# Patient Record
Sex: MALE | Race: WHITE | NOT HISPANIC OR LATINO | Employment: FULL TIME | URBAN - METROPOLITAN AREA
[De-identification: names, ages, dates, MRNs, and addresses within clinical notes are randomized per-mention and may not be internally consistent; named-entity substitution may affect disease eponyms.]

---

## 2017-04-20 ENCOUNTER — OFFICE VISIT (OUTPATIENT)
Dept: FAMILY MEDICINE | Facility: CLINIC | Age: 22
End: 2017-04-20
Payer: COMMERCIAL

## 2017-04-20 VITALS
HEART RATE: 78 BPM | HEIGHT: 70 IN | SYSTOLIC BLOOD PRESSURE: 135 MMHG | OXYGEN SATURATION: 97 % | TEMPERATURE: 96.7 F | DIASTOLIC BLOOD PRESSURE: 83 MMHG | BODY MASS INDEX: 21.33 KG/M2 | WEIGHT: 149 LBS

## 2017-04-20 DIAGNOSIS — K64.8 INTERNAL HEMORRHOIDS: Primary | ICD-10-CM

## 2017-04-20 DIAGNOSIS — K59.09 CHRONIC CONSTIPATION: ICD-10-CM

## 2017-04-20 PROCEDURE — 99213 OFFICE O/P EST LOW 20 MIN: CPT | Performed by: INTERNAL MEDICINE

## 2017-04-20 RX ORDER — HYDROCORTISONE ACETATE 25 MG/1
25 SUPPOSITORY RECTAL 2 TIMES DAILY
Qty: 28 SUPPOSITORY | Refills: 5 | Status: SHIPPED | OUTPATIENT
Start: 2017-04-20 | End: 2017-08-24

## 2017-04-20 ASSESSMENT — ANXIETY QUESTIONNAIRES
6. BECOMING EASILY ANNOYED OR IRRITABLE: MORE THAN HALF THE DAYS
GAD7 TOTAL SCORE: 8
2. NOT BEING ABLE TO STOP OR CONTROL WORRYING: NOT AT ALL
5. BEING SO RESTLESS THAT IT IS HARD TO SIT STILL: SEVERAL DAYS
1. FEELING NERVOUS, ANXIOUS, OR ON EDGE: SEVERAL DAYS
3. WORRYING TOO MUCH ABOUT DIFFERENT THINGS: SEVERAL DAYS
7. FEELING AFRAID AS IF SOMETHING AWFUL MIGHT HAPPEN: MORE THAN HALF THE DAYS
IF YOU CHECKED OFF ANY PROBLEMS ON THIS QUESTIONNAIRE, HOW DIFFICULT HAVE THESE PROBLEMS MADE IT FOR YOU TO DO YOUR WORK, TAKE CARE OF THINGS AT HOME, OR GET ALONG WITH OTHER PEOPLE: SOMEWHAT DIFFICULT

## 2017-04-20 ASSESSMENT — PATIENT HEALTH QUESTIONNAIRE - PHQ9: 5. POOR APPETITE OR OVEREATING: SEVERAL DAYS

## 2017-04-20 NOTE — PROGRESS NOTES
SUBJECTIVE:                                                    Venu Cedeño is a 22 year old male who presents to clinic today for the following health issues:    Hemorrhoids     Onset: 1-2 days    Description:   Pain: YES  Itching: YES    Accompanying Signs & Symptoms:  Blood streaked toilet paper: YES  Blood in stool: YES  Changes in stool pattern: YES   History:   Any previous GI studies done:none  Family History of colon cancer: no     Precipitating factors:   None    Alleviating factors:  None     Therapies Tried and outcome: increased fiber in diet and increased fluids, unsure of improvement          Problem list and histories reviewed & adjusted, as indicated.  Additional history: as documented    Patient Active Problem List   Diagnosis     Major depressive disorder, recurrent episode, mild (H)     WICHO (generalized anxiety disorder)     Psychophysiological insomnia     Past Surgical History:   Procedure Laterality Date     MOUTH SURGERY         Social History   Substance Use Topics     Smoking status: Current Some Day Smoker     Smokeless tobacco: Not on file      Comment: 1 pack per week, e-cigarettes      Alcohol use 4.8 oz/week     8 Standard drinks or equivalent per week     Family History   Problem Relation Age of Onset     DIABETES Maternal Grandmother      Coronary Artery Disease No family hx of      Hypertension No family hx of      Hyperlipidemia No family hx of      CEREBROVASCULAR DISEASE No family hx of      Breast Cancer No family hx of      Colon Cancer No family hx of      Prostate Cancer No family hx of          No Known Allergies  BP Readings from Last 3 Encounters:   04/20/17 135/83   10/07/16 136/80    Wt Readings from Last 3 Encounters:   04/20/17 149 lb (67.6 kg)   10/07/16 155 lb (70.3 kg)                      ROS:  C: NEGATIVE for fever, chills, change in weight  I: NEGATIVE for worrisome rashes, moles or lesions  E: NEGATIVE for vision changes or irritation  E/M: NEGATIVE for ear,  "mouth and throat problems  R: NEGATIVE for significant cough or SOB  CV: NEGATIVE for chest pain, palpitations or peripheral edema  GI: NEGATIVE for abdominal pain , diarrhea, jaundice and melena  : NEGATIVE for frequency, dysuria, or hematuria  M: NEGATIVE for significant arthralgias or myalgia  N: NEGATIVE for weakness, dizziness or paresthesias  E: NEGATIVE for temperature intolerance, skin/hair changes  H: NEGATIVE for bleeding problems  P: NEGATIVE for changes in mood or affect    OBJECTIVE:                                                    /83 (BP Location: Left arm, Patient Position: Chair, Cuff Size: Adult Regular)  Pulse 78  Temp 96.7  F (35.9  C) (Oral)  Ht 5' 10.25\" (1.784 m)  Wt 149 lb (67.6 kg)  SpO2 97%  BMI 21.23 kg/m2  Body mass index is 21.23 kg/(m^2).  GENERAL: healthy, alert and no distress  EYES: Eyes grossly normal to inspection  ABDOMEN: soft, nontender, no hepatosplenomegaly, no masses and bowel sounds normal  RECTAL (male): No external hemorrhoids.  MS: no gross musculoskeletal defects noted, no edema  SKIN: no suspicious lesions or rashes  NEURO: Normal strength and tone, mentation intact and speech normal    Diagnostic Test Results:  none      ASSESSMENT/PLAN:                                                            (K64.8) Internal hemorrhoids  (primary encounter diagnosis)  Comment: No evidence of external hemorrhoids. Anoscopy not done since such equipment is not available at my disposal. If condition worsens, then consider screening for inflammatory bowel disease.  Plan: hydrocortisone (ANUSOL-HC) 25 MG Suppository            (K59.09) Chronic constipation  Comment: Predisposing factor to conditon #1.  Plan: linaclotide (LINZESS) 145 MCG capsule              Follow-up visit if condition worsens.    Matthias Patel MD  [facility]  "

## 2017-04-20 NOTE — MR AVS SNAPSHOT
After Visit Summary   4/20/2017    Venu Cedeño    MRN: [id number]           Patient Information     Date Of Birth          1995        Visit Information        Provider Department      4/20/2017 4:40 PM Matthias Patel MD [facility]        Today's Diagnoses     Chronic constipation    -  1    Internal hemorrhoids          Care Instructions    This summary includes the important diagnoses, test, medications and other important parts of your medical history.  Below are a few good we sites you can use to learn more about these.     Www.[employer] : Up to date and easily searchable information on multiple topics.  Www.[facility]."[employer]/Pharmacy/c_539084.asp : [facility] $4.99 medications  Www.[employer].gov : medication info, interactive tutorials, watch real surgeries online  Www.familydoctor.org : good info from the Academy of Family Physicians  Www.[employer] : good info from the [facility]  Www.cdc.gov : public health info, travel advisories, epidemics (H1N1)  Www.aap.org : children's health info, normal development, vaccinations  Www.health.[facility].[place].us : [place] dept of heatlh, public health issues in [place], N1N1    Based on your medical history and these are the current health maintenance or preventive care services that you are due for (some may have been done at this visit:)  Health Maintenance Due   Topic Date Due     DEPRESSION ACTION PLAN Q1 YR (NO INBASKET)  1995     HPV IMMUNIZATION (1 of 3 - Male 3 Dose Series) 02/11/2006     PHQ-9 Q6 MONTHS (NO INBASKET)  04/07/2017     =================================================================================  Normal Values   Blood pressure  <140/90 for most adults    <130/80 for some chronic diseases (ask your care team about yours)    BMI (body mass index)  18.5-25 kg/m2 (based on height and weight)     Thank you for visiting [facility]    Normal or non-critical lab and imaging  results will be communicated to you by MyChart, letter or phone within 7 days.  If you do not hear from us within 10 days, please call the clinic. If you have a critical or abnormal lab result, we will notify you by phone as soon as possible.     If you have any questions regarding your visit please contact:     Team Comfort:   Clinic Hours Telephone Number   Dr. Herbert Taylor   7am-5pm  Monday - Friday [phone]  [facility]   Pharmacy 8am-8pm Monday-Thursday      8am-6pm Friday  9am-5pm Saturday-Sunday [phone]   Urgent Care 11am-8pm Monday-Friday        9am-5pm Saturday-Sunday [phone]     After hours, weekend or if you need to make an appointment with your primary provider please call [phone].   After Hours nurse advise: call [facility]: [phone]    Medication Refills:  Call your pharmacy and they will forward the refill to us. Please allow 3 business days for your refills to be completed.    Use [employer] (secure email communication and access to your chart) to send your primary care provider a message or make an appointment. Ask someone on your Team how to sign up for [employer]. To log on to [employer] or for more information in [employer] please visit the website at [email or web address].  As of October 8, 2013, all password changes, disabled accounts, or ID changes in [employer]/MyHealth will be done by our Access Services Department.   If you need help with your account or password, call: [phone]. Clinic staff no longer has the ability to change passwords.     Hemorrhoids    Hemorrhoids are swollen and inflamed veins inside the rectum and near the anus. The rectum is the last several inches of the colon. The anus is the passage between the rectum and the outside of the body.  Causes   The veins can become swollen due to increased pressure in them. This is most often caused by:    Chronic constipation  or diarrhea    Straining when having a bowel movement    Sitting too long on the toilet    A low-fiber diet    Pregnancy  Symptoms     Bleeding from the rectum (this may be noticeable after bowel movements)    Lump near the anus    Itching around the anus    Pain around the anus  There are different types of hemorrhoids. Depending on the type you have and the severity, you may be able to treat yourself at home. In some cases, a procedure may be the best treatment option. Your healthcare provider can tell you more about this, if needed.  Home care  General care    To get relief from pain or itching, try:    Topical products. Your healthcare provider may prescribe or recommend creams, ointments, or pads that can be applied to the hemorrhoid. Use these exactly as directed.    Medicines. Your healthcare provider may recommend stool softeners, suppositories, or laxatives to help manage constipation. Use these exactly as directed.    Sitz baths. A sitz bath involves sitting in a few inches of warm bath water. Be careful not to make the water so hot that you burn yourself--test it before sitting in it. Soak for about 10 to 15 minutes a few times a day. This may help relieve pain.  Tips to help prevent hemorrhoids    Eat more fiber. Fiber adds bulk to stool and absorbs water as it moves through your colon. This makes stool softer and easier to pass.    Increase the fiber in your diet with more fiber-rich foods. These include fresh fruit, vegetables, and whole grains.    Take a fiber supplement or bulking agent, if advised to by your provider. These include products such as psyllium or methylcellulose.    Drink plenty of water, if directed to by your provider. This can help keep stool soft.    Be more active. Frequent exercise aids digestion and helps prevent constipation. It may also help make bowel movements more regular.    Don t strain during bowel movements. This can make hemorrhoids more likely. Also, don t sit on the  toilet for long periods of time.  Follow-up care  Follow up with your healthcare provider, or as advised. If a culture or imaging tests were done, you will be notified of the results when they are ready. This may take a few days or longer.  When to seek medical advice  Call your healthcare provider right away if any of these occur:    Increased bleeding from the rectum    Increased pain around the rectum or anus    Weakness or dizziness   Call 911   Call 911 or return to the emergency department right away if any of these occur:    Trouble breathing or swallowing    Fainting or loss of consciousness    Unusually fast heart rate    Vomiting blood    Large amounts of blood in stool      5519-2272 [employer]. [address] [place] [address]. All rights reserved. This information is not intended as a substitute for professional medical care. Always follow your healthcare professional's instructions.        Treating Hemorrhoids: Removal  If your symptoms persist, your doctor may recommend removing the hemorrhoid. This can be done in your doctor s office or at a surgical center. In most cases, no special preparation is needed. Keep in mind that your treatment may differ depending on your symptoms and the location of the hemorrhoid.  Internal Hemorrhoids  You ll be asked to lie or kneel on a table. Your doctor then inserts an anoscope to view the anal canal. To treat the hemorrhoid, your doctor will use one of the methods listed below. Because internal hemorrhoids do not have nerves that sense pain, you won t have too much discomfort. You can often return to your normal routine the same day. If you have many hemorrhoids, you may need repeated treatments.    Banding  The banding method is done by placing tight elastic bands around the base of the hemorrhoid. This cuts off blood supply to the hemorrhoid, causing it to fall off. This usually takes about a week. The area then heals within a few  days.       Infrared Coagulation  This procedure is done using a small probe that exposes the hemorrhoid to short bursts of infrared light. This seals off the blood vessel, causing it to shrink. Slight bleeding may occur for a few days. The area usually heals within a week or two.       Sclerotherapy  Sclerotherapy is done by injecting a chemical into the tissue around the hemorrhoid. The chemical causes the hemorrhoid to shrink within a few days. Bleeding usually stops in about 24 hours.  Thrombosed External Hemorrhoids  Thrombosed external hemorrhoids are often very painful. That s because the swollen hemorrhoid stretches the sensitive skin around it. To relieve the pain, your doctor may remove the blood clot. This takes just a few minutes. You may need to rest for a few days before returning to work.    Numbing the Hemorrhoid: You ll be asked to lie or kneel on a table. The hemorrhoid is then injected with a local anesthetic. This may cause some discomfort for a moment. But within a short time your doctor will be able to remove the hemorrhoid without causing pain.    Removing the Hemorrhoid: A small incision is made to remove the blood clot. The hemorrhoid may also be removed. The skin is then either closed with sutures or left open to heal on its own. The area around the incision will likely be sore for a few days. But your pain should improve soon after the procedure.  Risks and Complications  The possible risks and complications include:    Infection    Bleeding    Trouble urinating (Doesn't occur with thrombosed external hemorrhoids)    Narrowing of the anal canal (very rare, doesn't occur with thrombosed external hemorrhoids)  When to Call Your Doctor  After your procedure, call your doctor if you have:    Increasing pain    Fever or chills    Persistent bleeding    Trouble urinating        5106-0134 [employer]. [address] [place] [address], [place] [address]. All rights reserved. This information  is not intended as a substitute for professional medical care. Always follow your healthcare professional's instructions.        Understanding Hemorrhoids  Hemorrhoid tissues are  cushions  of blood vessels that swell slightly during bowel movements. Too much pressure on the anal canal can make these tissues remain enlarged and cause symptoms. This can happen both inside and outside the anal canal.    Parts of the Anal Canal    Internal hemorrhoid tissue is in the upper area of the anal canal.    The rectum is the last several inches of the colon. This is where stool is stored prior to bowel movements.    Anal sphincters are ring-shaped muscles that expand and contract to control the anal opening.    External hemorrhoid tissue lies under the anal skin.    The anus is the passage between the rectum and the outside of the body.  Normal Hemorrhoid Tissue  Hemorrhoid tissues play an important role in helping your body eliminate waste. Food passes from the stomach through the intestines. The waste (stool) then travels through the colon to the rectum. It is stored in the rectum until it s ready to be passed from the anus. During bowel movements, hemorrhoids swell with blood and become slightly larger. This swelling helps protect and cushion the anal canal as stool passes from the body. Once the stool has passed, the tissues stop swelling and return to normal.    Problem Hemorrhoids  Pressure due to straining or other factors can cause hemorrhoid tissues to remain swollen. When this happens to the hemorrhoid tissues in the anal canal they re called internal hemorrhoids. Swollen tissues around the anal opening are called external hemorrhoids. Depending on the location, your symptoms can differ.    Internal hemorrhoids often occur in clusters around the wall of the anal canal. They are usually painless. But they may prolapse (protrude out of the anus) due to straining or pressure from hard stool. After the bowel movement is  over, they may then reduce (return inside the body). Internal hemorrhoids often bleed. They can also discharge mucus.    External hemorrhoids are located at the anal opening, just beneath the skin. These tissues rarely cause problems unless they thrombose (form a blood clot). When this occurs, a hard, bluish lump may appear. A thrombosed hemorrhoid also causes sudden, severe pain. In time, the clot may go away on its own. This sometimes leaves a  skin tag  of tissue stretched by the clot.  Hemorrhoid Symptoms  Hemorrhoid symptoms may include:    Pain or a burning sensation    Bleeding during bowel movements    Protrusion of tissue from the anus    Itching around the anus  Causes of Hemorrhoids  There s no single cause of hemorrhoids. Most often, though, they are caused by too much pressure on the anal canal. This can be due to:    Chronic (ongoing) constipation    Straining during bowel movements    Sitting too long on the toilet    Strenuous exercise or heavy lifting   Pregnancy and childbirth    Aging    Diarrhea     8752-0295 [employer]. [address] [place] [address]. All rights reserved. This information is not intended as a substitute for professional medical care. Always follow your healthcare professional's instructions.        Crohn s Disease    Crohn s disease is inflammation of the intestinal tract that comes and goes in  flare-ups . This is a chronic (long-term) illness. During a flare-up, intense abdominal pain and fever may be felt. Mucus, blood, or pus may appear in the stool. Between flare ups, inflammation lessens and there usually are no symptoms. Crohn s disease is a form of inflammatory bowel disease (IBD).   Symptoms of Crohn's disease may include:    Abdominal cramps and pain    Diarrhea, usually bloody, alternating with constipation    Mucus in stools    Rectal bleeding    Rectal pain    Fever    Low energy    Decreased appetite and weight loss  No one knows what  "exactly causes Crohn's disease, and there is no cure. The goal of treatment is to control and relieve symptoms and prevent complications, so you can lead a full and active life. No single treatment works for everyone, but many things can be done to help.  Diet  Foods did not cause your Crohn's, but they can affect it. Unfortunately, there is no one diet that works for everyone. Below are some things to try. Keep a food log to figure out what you are sensitive to.    Eat more slowly and smaller amounts at a time, but more often. Remember, you can always eat more, but cannot eat less once you've eaten too much.    High fiber foods are complicated. While they may help constipation they can make the bloating, cramping, gas, and diarrhea worse.    Try avoiding dairy products, sometimes this helps    Try cutting out foods that are high in fat and fatty meats    Eat less sugar    Bloating or passing excess gas may be controlled. Be careful with \"gassy\" vegetables and fruits like beans, cabbage, broccoli, and cauliflower.    Be careful of carbonated beverages and fruit juices. They can make the bloating and diarrhea worse.    Caffeine, alcohol, and stimulants may worsen symptoms. These include coffee, tea, sodas, energy drinks, and chocolate.  Lifestyle  Although stress does not cause Crohn's, it is often a factor in flare-ups. It can also affect how you feel about and cope with your condition.    Look for factors that seem to worsen your symptoms such as stress and emotions.    Counseling can often help relieve stress. So can self-help measures such as exercise, yoga, and meditation.    Depression can be a part of this illness and antidepressants may be prescribed. This may actually help with diarrhea, constipation, and cramping, as well as depression.    Smoking doesn't cause Crohn's, but can make the symptoms worse.  Medicines  Your health care provider may prescribe medicines. If so, take them as directed. For acute " flare-ups, prescription medicines can be prescribed. Contact your provider if you need this.    Ask your health care provider before taking any antidiarrheal medicines.    Avoid anti-inflammatory medicines like ibuprofen or naproxen.    Consider nutritional supplements. This is especially true if the diarrhea is prolonged, or you aren't eating or are losing weight.  Follow-up care  Follow up with your healthcare provider, or as advised. If a stool sample was taken or cultures were done, you will be told if your treatment needs to change. Call as directed for the results.  Support  Support groups for persons Crohn s disease can be a source of useful information on how others are coping with this illness. They are available in person, on the phone, or via the Internet. Contact the following resources for more information.    Crohn s and Colitis Foundation of [employer] 428.985.5646 www.ccfa.org    National Digestive Diseases Information Clearinghouse (NDDIC) 570.948.8165 www.digestive.niddk.nih.gov  When to seek medical advice  Call your health care provider right away if any of these occur:    Fever of 100.4 F (38 C) or higher, or as directed by your health care provider    Abdominal pain that doesn't get better when you take the usual measures    Mucus, pus, or blood in the stool (dark or bright red)    Repeated vomiting    Abdominal swelling and pain that doesn't go away after a few hours  Call 911  Call 911 if any of these occur:    Trouble breathing    Confusion    Extreme sleepiness or trouble waking up    Fainting or loss of consciousness    Rapid heart rate    0144-0023 [employer]. [address] [place] [address], [place] [address]. All rights reserved. This information is not intended as a substitute for professional medical care. Always follow your healthcare professional's instructions.        What is Ulcerative Colitis?  Ulcerative colitis is a chronic disease that causes inflammation and sores  called ulcers in the inner lining of the rectum and colon. It is a form of inflammatory bowel disease (IBD). No one knows for certain what causes ulcerative colitis, but symptoms can be treated. People with ulcerative colitis can lead full, active lives.     Ulcerative colitis affects the inside layers of the rectum and colon.   Symptoms of ulcerative colitis  Symptoms are often related to bowel movements. Symptoms include:    Frequent, loose bowel movements    Blood and pus in stools, or rectal bleeding    Feeling of incomplete bowel movement    Urgency (feeling that you need to have a bowel movement right away)    Crampy, abdominal discomfort    Loss of appetite    Fatigue    Joint pain    Rectal pain that comes and goes  Your treatment options  Your doctor will discuss your treatment options with you. They may include medications, lifestyle changes, surgery, or a combination of these. Treatment helps you stay as active as you want to be. Keep in mind that ulcerative colitis is considered chronic. That means it usually can t be cured. But treatment may ease symptoms. And even though you have a chronic illness, you can still live a full life.  Medications  Your doctor will try to find the medications that work best for you. These may include:    A type of anti-inflammatory medication (called 5-ASA compounds or mesalamine) to help reduce intestinal swelling and inflammation    Corticosteroids (used for short-term, not maintenance, treatment) to help reduce inflammation (swelling, short-term irritation)    Antibiotics to fight bacteria, if there is an infection    Medications to control your body s immune system, such as immunomodulators or biologic agents  Lifestyle changes    Certain foods can worsen symptoms. You may need to change what you eat. Avoid any food that makes your symptoms worse. These foods vary from person to person. But high-fiber foods (such as fresh vegetables) and high-fat foods (such as dairy  "products and red meat) cause symptoms in many people. Keep track of foods that cause you problems.    To a lesser dengree, stress also can worsen symptoms. Reducing stress may help. Techniques like relaxation exercises, meditation, and deep breathing can help you control stress. Your health care provider may be able to tell you more about these.  If surgery is needed  Surgery may help control or even cure ulcerative colitis by removing a severely affected part of the colon. If this is an option for you, your doctor can give you more information.    3233-0208 [employer]. [address] [place] [address]. All rights reserved. This information is not intended as a substitute for professional medical care. Always follow your healthcare professional's instructions.              Follow-ups after your visit        Who to contact     If you have questions or need follow up information about today's clinic visit or your schedule please contact [facility] directly at [phone].  Normal or non-critical lab and imaging results will be communicated to you by MyChart, letter or phone within 4 business days after the clinic has received the results. If you do not hear from us within 7 days, please contact the clinic through myOrdert or phone. If you have a critical or abnormal lab result, we will notify you by phone as soon as possible.  Submit refill requests through [employer] or call your pharmacy and they will forward the refill request to us. Please allow 3 business days for your refill to be completed.          Additional Information About Your Visit        [employer] Information     [employer] lets you send messages to your doctor, view your test results, renew your prescriptions, schedule appointments and more. To sign up, go to [email or web address] . Click on \"Log in\" on the left side of the screen, which will take you to the Welcome page. Then click on \"Sign up Now\" on the right " "side of the page.     You will be asked to enter the access code listed below, as well as some personal information. Please follow the directions to create your username and password.     Your access code is: [email or web address]  Expires: 2017  5:08 PM     Your access code will  in 90 days. If you need help or a new code, please call your [facility] clinic or [phone].        Care EveryWhere ID     This is your Care EveryWhere ID. This could be used by other organizations to access your [facility] medical records  [id number]        Your Vitals Were     Pulse Temperature Height Pulse Oximetry BMI (Body Mass Index)       78 96.7  F (35.9  C) (Oral) 5' 10.25\" (1.784 m) 97% 21.23 kg/m2        Blood Pressure from Last 3 Encounters:   17 135/83   10/07/16 136/80    Weight from Last 3 Encounters:   17 149 lb (67.6 kg)   10/07/16 155 lb (70.3 kg)              Today, you had the following     No orders found for display         Today's Medication Changes          These changes are accurate as of: 17  5:10 PM.  If you have any questions, ask your nurse or doctor.               Start taking these medicines.        Dose/Directions    hydrocortisone 25 MG Suppository   Commonly known as:  ANUSOL-HC   Used for:  Internal hemorrhoids   Started by:  Matthias Patel MD        Dose:  25 mg   Place 1 suppository (25 mg) rectally 2 times daily   Quantity:  28 suppository   Refills:  5       linaclotide 145 MCG capsule   Commonly known as:  LINZESS   Used for:  Chronic constipation   Started by:  Matthias Patel MD        Dose:  145 mcg   Take 1 capsule (145 mcg) by mouth every morning (before breakfast)   Quantity:  30 capsule   Refills:  11            Where to get your medicines      These medications were sent to [facility] - [place], [place] - [address]  [address], [facility] [address]     Phone:  [phone]     hydrocortisone 25 MG Suppository    linaclotide " 145 MCG capsule                Primary Care Provider Office Phone # Fax #    Matthias Patel [phone]       [facility] [address]  [facility] [address]        Thank you!     Thank you for choosing [facility]  for your care. Our goal is always to provide you with excellent care. Hearing back from our patients is one way we can continue to improve our services. Please take a few minutes to complete the written survey that you may receive in the mail after your visit with us. Thank you!             Your Updated Medication List - Protect others around you: Learn how to safely use, store and throw away your medicines at www.disposemymeds.org.          This list is accurate as of: 4/20/17  5:10 PM.  Always use your most recent med list.                   Brand Name Dispense Instructions for use    hydrocortisone 25 MG Suppository    ANUSOL-HC    28 suppository    Place 1 suppository (25 mg) rectally 2 times daily       linaclotide 145 MCG capsule    LINZESS    30 capsule    Take 1 capsule (145 mcg) by mouth every morning (before breakfast)

## 2017-04-20 NOTE — NURSING NOTE
"Chief Complaint   Patient presents with     Rectal Problem     hemorrhoids       Initial /83 (BP Location: Left arm, Patient Position: Chair, Cuff Size: Adult Regular)  Pulse 78  Temp 96.7  F (35.9  C) (Oral)  Ht 5' 10.25\" (1.784 m)  Wt 149 lb (67.6 kg)  SpO2 97%  BMI 21.23 kg/m2 Estimated body mass index is 21.23 kg/(m^2) as calculated from the following:    Height as of this encounter: 5' 10.25\" (1.784 m).    Weight as of this encounter: 149 lb (67.6 kg).  Medication Reconciliation: complete     Pa Irene Infante MA      "

## 2017-04-20 NOTE — PATIENT INSTRUCTIONS
This summary includes the important diagnoses, test, medications and other important parts of your medical history.  Below are a few good we sites you can use to learn more about these.     Www.[employer].org : Up to date and easily searchable information on multiple topics.  Www.[employer].[email or web address]/c_[email or web address] : [facility] $4.99 medications  Www.medlineplus.gov : medication info, interactive tutorials, watch real surgeries online  Www.familydoctor.org : good info from the Academy of Family Physicians  Www.[employer].com : good info from the [facility]  Www.cdc.gov : public health info, travel advisories, epidemics (H1N1)  Www.aap.org : children's health info, normal development, vaccinations  Www.health.[facility].[place].us : [place] dept of heat, public health issues in [place], N1N1    Based on your medical history and these are the current health maintenance or preventive care services that you are due for (some may have been done at this visit:)  Health Maintenance Due   Topic Date Due     DEPRESSION ACTION PLAN Q1 YR (NO INBASKET)  1995     HPV IMMUNIZATION (1 of 3 - Male 3 Dose Series) 02/11/2006     PHQ-9 Q6 MONTHS (NO INBASKET)  04/07/2017     =================================================================================  Normal Values   Blood pressure  <140/90 for most adults    <130/80 for some chronic diseases (ask your care team about yours)    BMI (body mass index)  18.5-25 kg/m2 (based on height and weight)     Thank you for visiting [facility]    Normal or non-critical lab and imaging results will be communicated to you by MyChart, letter or phone within 7 days.  If you do not hear from us within 10 days, please call the clinic. If you have a critical or abnormal lab result, we will notify you by phone as soon as possible.     If you have any questions regarding your visit please contact:     Team Comfort:   Clinic Hours Telephone Number   Dr. Herbert Patel Dr.  Lin Saucedo  [facility]   7am-5pm  Monday - Friday [phone]  [facility]   Pharmacy 8am-8pm Monday-Thursday      8am-6pm Friday  9am-5pm Saturday-Sunday [phone]   Urgent Care 11am-8pm Monday-Friday        9am-5pm Saturday-Sunday [phone]     After hours, weekend or if you need to make an appointment with your primary provider please call [phone].   After Hours nurse advise: call [facility]: [phone]    Medication Refills:  Call your pharmacy and they will forward the refill to us. Please allow 3 business days for your refills to be completed.    Use [employer] Outdoors (secure email communication and access to your chart) to send your primary care provider a message or make an appointment. Ask someone on your Team how to sign up for GO Outdoors. To log on to 1DayLater or for more information in [employer] please visit the website at [email or web address].  As of October 8, 2013, all password changes, disabled accounts, or ID changes in GO Outdoors/MyHealth will be done by our Access Services Department.   If you need help with your account or password, call: [phone]. Clinic staff no longer has the ability to change passwords.     Hemorrhoids    Hemorrhoids are swollen and inflamed veins inside the rectum and near the anus. The rectum is the last several inches of the colon. The anus is the passage between the rectum and the outside of the body.  Causes   The veins can become swollen due to increased pressure in them. This is most often caused by:    Chronic constipation or diarrhea    Straining when having a bowel movement    Sitting too long on the toilet    A low-fiber diet    Pregnancy  Symptoms     Bleeding from the rectum (this may be noticeable after bowel movements)    Lump near the anus    Itching around the anus    Pain around the anus  There are different types of hemorrhoids. Depending on the type you have and the severity, you may be able to treat  yourself at home. In some cases, a procedure may be the best treatment option. Your healthcare provider can tell you more about this, if needed.  Home care  General care    To get relief from pain or itching, try:    Topical products. Your healthcare provider may prescribe or recommend creams, ointments, or pads that can be applied to the hemorrhoid. Use these exactly as directed.    Medicines. Your healthcare provider may recommend stool softeners, suppositories, or laxatives to help manage constipation. Use these exactly as directed.    Sitz baths. A sitz bath involves sitting in a few inches of warm bath water. Be careful not to make the water so hot that you burn yourself--test it before sitting in it. Soak for about 10 to 15 minutes a few times a day. This may help relieve pain.  Tips to help prevent hemorrhoids    Eat more fiber. Fiber adds bulk to stool and absorbs water as it moves through your colon. This makes stool softer and easier to pass.    Increase the fiber in your diet with more fiber-rich foods. These include fresh fruit, vegetables, and whole grains.    Take a fiber supplement or bulking agent, if advised to by your provider. These include products such as psyllium or methylcellulose.    Drink plenty of water, if directed to by your provider. This can help keep stool soft.    Be more active. Frequent exercise aids digestion and helps prevent constipation. It may also help make bowel movements more regular.    Don t strain during bowel movements. This can make hemorrhoids more likely. Also, don t sit on the toilet for long periods of time.  Follow-up care  Follow up with your healthcare provider, or as advised. If a culture or imaging tests were done, you will be notified of the results when they are ready. This may take a few days or longer.  When to seek medical advice  Call your healthcare provider right away if any of these occur:    Increased bleeding from the rectum    Increased pain around  the rectum or anus    Weakness or dizziness   Call 911   Call 911 or return to the emergency department right away if any of these occur:    Trouble breathing or swallowing    Fainting or loss of consciousness    Unusually fast heart rate    Vomiting blood    Large amounts of blood in stool      [id number] [employer]. [address] [place] [address]. All rights reserved. This information is not intended as a substitute for professional medical care. Always follow your healthcare professional's instructions.        Treating Hemorrhoids: Removal  If your symptoms persist, your doctor may recommend removing the hemorrhoid. This can be done in your doctor s office or at a surgical center. In most cases, no special preparation is needed. Keep in mind that your treatment may differ depending on your symptoms and the location of the hemorrhoid.  Internal Hemorrhoids  You ll be asked to lie or kneel on a table. Your doctor then inserts an anoscope to view the anal canal. To treat the hemorrhoid, your doctor will use one of the methods listed below. Because internal hemorrhoids do not have nerves that sense pain, you won t have too much discomfort. You can often return to your normal routine the same day. If you have many hemorrhoids, you may need repeated treatments.    Banding  The banding method is done by placing tight elastic bands around the base of the hemorrhoid. This cuts off blood supply to the hemorrhoid, causing it to fall off. This usually takes about a week. The area then heals within a few days.       Infrared Coagulation  This procedure is done using a small probe that exposes the hemorrhoid to short bursts of infrared light. This seals off the blood vessel, causing it to shrink. Slight bleeding may occur for a few days. The area usually heals within a week or two.       Sclerotherapy  Sclerotherapy is done by injecting a chemical into the tissue around the hemorrhoid. The chemical  causes the hemorrhoid to shrink within a few days. Bleeding usually stops in about 24 hours.  Thrombosed External Hemorrhoids  Thrombosed external hemorrhoids are often very painful. That s because the swollen hemorrhoid stretches the sensitive skin around it. To relieve the pain, your doctor may remove the blood clot. This takes just a few minutes. You may need to rest for a few days before returning to work.    Numbing the Hemorrhoid: You ll be asked to lie or kneel on a table. The hemorrhoid is then injected with a local anesthetic. This may cause some discomfort for a moment. But within a short time your doctor will be able to remove the hemorrhoid without causing pain.    Removing the Hemorrhoid: A small incision is made to remove the blood clot. The hemorrhoid may also be removed. The skin is then either closed with sutures or left open to heal on its own. The area around the incision will likely be sore for a few days. But your pain should improve soon after the procedure.  Risks and Complications  The possible risks and complications include:    Infection    Bleeding    Trouble urinating (Doesn't occur with thrombosed external hemorrhoids)    Narrowing of the anal canal (very rare, doesn't occur with thrombosed external hemorrhoids)  When to Call Your Doctor  After your procedure, call your doctor if you have:    Increasing pain    Fever or chills    Persistent bleeding    Trouble urinating        4846-1768 [employer]. [address] [place] [address]. All rights reserved. This information is not intended as a substitute for professional medical care. Always follow your healthcare professional's instructions.        Understanding Hemorrhoids  Hemorrhoid tissues are  cushions  of blood vessels that swell slightly during bowel movements. Too much pressure on the anal canal can make these tissues remain enlarged and cause symptoms. This can happen both inside and outside the anal  canal.    Parts of the Anal Canal    Internal hemorrhoid tissue is in the upper area of the anal canal.    The rectum is the last several inches of the colon. This is where stool is stored prior to bowel movements.    Anal sphincters are ring-shaped muscles that expand and contract to control the anal opening.    External hemorrhoid tissue lies under the anal skin.    The anus is the passage between the rectum and the outside of the body.  Normal Hemorrhoid Tissue  Hemorrhoid tissues play an important role in helping your body eliminate waste. Food passes from the stomach through the intestines. The waste (stool) then travels through the colon to the rectum. It is stored in the rectum until it s ready to be passed from the anus. During bowel movements, hemorrhoids swell with blood and become slightly larger. This swelling helps protect and cushion the anal canal as stool passes from the body. Once the stool has passed, the tissues stop swelling and return to normal.    Problem Hemorrhoids  Pressure due to straining or other factors can cause hemorrhoid tissues to remain swollen. When this happens to the hemorrhoid tissues in the anal canal they re called internal hemorrhoids. Swollen tissues around the anal opening are called external hemorrhoids. Depending on the location, your symptoms can differ.    Internal hemorrhoids often occur in clusters around the wall of the anal canal. They are usually painless. But they may prolapse (protrude out of the anus) due to straining or pressure from hard stool. After the bowel movement is over, they may then reduce (return inside the body). Internal hemorrhoids often bleed. They can also discharge mucus.    External hemorrhoids are located at the anal opening, just beneath the skin. These tissues rarely cause problems unless they thrombose (form a blood clot). When this occurs, a hard, bluish lump may appear. A thrombosed hemorrhoid also causes sudden, severe pain. In time,  the clot may go away on its own. This sometimes leaves a  skin tag  of tissue stretched by the clot.  Hemorrhoid Symptoms  Hemorrhoid symptoms may include:    Pain or a burning sensation    Bleeding during bowel movements    Protrusion of tissue from the anus    Itching around the anus  Causes of Hemorrhoids  There s no single cause of hemorrhoids. Most often, though, they are caused by too much pressure on the anal canal. This can be due to:    Chronic (ongoing) constipation    Straining during bowel movements    Sitting too long on the toilet    Strenuous exercise or heavy lifting   Pregnancy and childbirth    Aging    Diarrhea     [id number] [employer]. [address] [place] [address]. All rights reserved. This information is not intended as a substitute for professional medical care. Always follow your healthcare professional's instructions.        Crohn s Disease    Crohn s disease is inflammation of the intestinal tract that comes and goes in  flare-ups . This is a chronic (long-term) illness. During a flare-up, intense abdominal pain and fever may be felt. Mucus, blood, or pus may appear in the stool. Between flare ups, inflammation lessens and there usually are no symptoms. Crohn s disease is a form of inflammatory bowel disease (IBD).   Symptoms of Crohn's disease may include:    Abdominal cramps and pain    Diarrhea, usually bloody, alternating with constipation    Mucus in stools    Rectal bleeding    Rectal pain    Fever    Low energy    Decreased appetite and weight loss  No one knows what exactly causes Crohn's disease, and there is no cure. The goal of treatment is to control and relieve symptoms and prevent complications, so you can lead a full and active life. No single treatment works for everyone, but many things can be done to help.  Diet  Foods did not cause your Crohn's, but they can affect it. Unfortunately, there is no one diet that works for everyone. Below are some  "things to try. Keep a food log to figure out what you are sensitive to.    Eat more slowly and smaller amounts at a time, but more often. Remember, you can always eat more, but cannot eat less once you've eaten too much.    High fiber foods are complicated. While they may help constipation they can make the bloating, cramping, gas, and diarrhea worse.    Try avoiding dairy products, sometimes this helps    Try cutting out foods that are high in fat and fatty meats    Eat less sugar    Bloating or passing excess gas may be controlled. Be careful with \"gassy\" vegetables and fruits like beans, cabbage, broccoli, and cauliflower.    Be careful of carbonated beverages and fruit juices. They can make the bloating and diarrhea worse.    Caffeine, alcohol, and stimulants may worsen symptoms. These include coffee, tea, sodas, energy drinks, and chocolate.  Lifestyle  Although stress does not cause Crohn's, it is often a factor in flare-ups. It can also affect how you feel about and cope with your condition.    Look for factors that seem to worsen your symptoms such as stress and emotions.    Counseling can often help relieve stress. So can self-help measures such as exercise, yoga, and meditation.    Depression can be a part of this illness and antidepressants may be prescribed. This may actually help with diarrhea, constipation, and cramping, as well as depression.    Smoking doesn't cause Crohn's, but can make the symptoms worse.  Medicines  Your health care provider may prescribe medicines. If so, take them as directed. For acute flare-ups, prescription medicines can be prescribed. Contact your provider if you need this.    Ask your health care provider before taking any antidiarrheal medicines.    Avoid anti-inflammatory medicines like ibuprofen or naproxen.    Consider nutritional supplements. This is especially true if the diarrhea is prolonged, or you aren't eating or are losing weight.  Follow-up care  Follow up with " your healthcare provider, or as advised. If a stool sample was taken or cultures were done, you will be told if your treatment needs to change. Call as directed for the results.  Support  Support groups for persons Crohn s disease can be a source of useful information on how others are coping with this illness. They are available in person, on the phone, or via the Internet. Contact the following resources for more information.    Crohn s and Colitis Foundation of [employer] 458.879.1700 www.ccfa.org    National Digestive Diseases Information Clearinghouse (NDDIC) 308.624.7694 www.digestive.niddk.nih.gov  When to seek medical advice  Call your health care provider right away if any of these occur:    Fever of 100.4 F (38 C) or higher, or as directed by your health care provider    Abdominal pain that doesn't get better when you take the usual measures    Mucus, pus, or blood in the stool (dark or bright red)    Repeated vomiting    Abdominal swelling and pain that doesn't go away after a few hours  Call 911  Call 911 if any of these occur:    Trouble breathing    Confusion    Extreme sleepiness or trouble waking up    Fainting or loss of consciousness    Rapid heart rate    7535-6446 [employer]. [address] [place] [address]. All rights reserved. This information is not intended as a substitute for professional medical care. Always follow your healthcare professional's instructions.        What is Ulcerative Colitis?  Ulcerative colitis is a chronic disease that causes inflammation and sores called ulcers in the inner lining of the rectum and colon. It is a form of inflammatory bowel disease (IBD). No one knows for certain what causes ulcerative colitis, but symptoms can be treated. People with ulcerative colitis can lead full, active lives.     Ulcerative colitis affects the inside layers of the rectum and colon.   Symptoms of ulcerative colitis  Symptoms are often related to bowel  movements. Symptoms include:    Frequent, loose bowel movements    Blood and pus in stools, or rectal bleeding    Feeling of incomplete bowel movement    Urgency (feeling that you need to have a bowel movement right away)    Crampy, abdominal discomfort    Loss of appetite    Fatigue    Joint pain    Rectal pain that comes and goes  Your treatment options  Your doctor will discuss your treatment options with you. They may include medications, lifestyle changes, surgery, or a combination of these. Treatment helps you stay as active as you want to be. Keep in mind that ulcerative colitis is considered chronic. That means it usually can t be cured. But treatment may ease symptoms. And even though you have a chronic illness, you can still live a full life.  Medications  Your doctor will try to find the medications that work best for you. These may include:    A type of anti-inflammatory medication (called 5-ASA compounds or mesalamine) to help reduce intestinal swelling and inflammation    Corticosteroids (used for short-term, not maintenance, treatment) to help reduce inflammation (swelling, short-term irritation)    Antibiotics to fight bacteria, if there is an infection    Medications to control your body s immune system, such as immunomodulators or biologic agents  Lifestyle changes    Certain foods can worsen symptoms. You may need to change what you eat. Avoid any food that makes your symptoms worse. These foods vary from person to person. But high-fiber foods (such as fresh vegetables) and high-fat foods (such as dairy products and red meat) cause symptoms in many people. Keep track of foods that cause you problems.    To a lesser dengree, stress also can worsen symptoms. Reducing stress may help. Techniques like relaxation exercises, meditation, and deep breathing can help you control stress. Your health care provider may be able to tell you more about these.  If surgery is needed  Surgery may help control or  even cure ulcerative colitis by removing a severely affected part of the colon. If this is an option for you, your doctor can give you more information.    8263-9854 [employer]. [address] [place] [address], [place] [address]. All rights reserved. This information is not intended as a substitute for professional medical care. Always follow your healthcare professional's instructions.

## 2017-04-21 ASSESSMENT — ANXIETY QUESTIONNAIRES: GAD7 TOTAL SCORE: 8

## 2017-04-21 ASSESSMENT — PATIENT HEALTH QUESTIONNAIRE - PHQ9: SUM OF ALL RESPONSES TO PHQ QUESTIONS 1-9: 16

## 2017-08-24 ENCOUNTER — RADIANT APPOINTMENT (OUTPATIENT)
Dept: GENERAL RADIOLOGY | Facility: CLINIC | Age: 22
End: 2017-08-24
Attending: INTERNAL MEDICINE
Payer: COMMERCIAL

## 2017-08-24 ENCOUNTER — OFFICE VISIT (OUTPATIENT)
Dept: FAMILY MEDICINE | Facility: CLINIC | Age: 22
End: 2017-08-24
Payer: COMMERCIAL

## 2017-08-24 VITALS
BODY MASS INDEX: 20.76 KG/M2 | WEIGHT: 145 LBS | DIASTOLIC BLOOD PRESSURE: 79 MMHG | OXYGEN SATURATION: 96 % | HEIGHT: 70 IN | SYSTOLIC BLOOD PRESSURE: 136 MMHG | HEART RATE: 72 BPM | TEMPERATURE: 98.4 F

## 2017-08-24 DIAGNOSIS — D22.9 NEVUS: ICD-10-CM

## 2017-08-24 DIAGNOSIS — R59.0 CERVICAL LYMPHADENOPATHY: Primary | ICD-10-CM

## 2017-08-24 LAB
RETICS # AUTO: 68.5 10E9/L (ref 25–95)
RETICS/RBC NFR AUTO: 1.5 % (ref 0.5–2)

## 2017-08-24 PROCEDURE — 99213 OFFICE O/P EST LOW 20 MIN: CPT | Performed by: INTERNAL MEDICINE

## 2017-08-24 PROCEDURE — 36415 COLL VENOUS BLD VENIPUNCTURE: CPT | Performed by: INTERNAL MEDICINE

## 2017-08-24 PROCEDURE — 85060 BLOOD SMEAR INTERPRETATION: CPT | Performed by: INTERNAL MEDICINE

## 2017-08-24 PROCEDURE — 85045 AUTOMATED RETICULOCYTE COUNT: CPT | Performed by: INTERNAL MEDICINE

## 2017-08-24 PROCEDURE — 85025 COMPLETE CBC W/AUTO DIFF WBC: CPT | Performed by: INTERNAL MEDICINE

## 2017-08-24 PROCEDURE — 71020 XR CHEST 2 VW: CPT

## 2017-08-24 ASSESSMENT — ANXIETY QUESTIONNAIRES
GAD7 TOTAL SCORE: 5
2. NOT BEING ABLE TO STOP OR CONTROL WORRYING: SEVERAL DAYS
1. FEELING NERVOUS, ANXIOUS, OR ON EDGE: NOT AT ALL
3. WORRYING TOO MUCH ABOUT DIFFERENT THINGS: SEVERAL DAYS
6. BECOMING EASILY ANNOYED OR IRRITABLE: MORE THAN HALF THE DAYS
5. BEING SO RESTLESS THAT IT IS HARD TO SIT STILL: NOT AT ALL
IF YOU CHECKED OFF ANY PROBLEMS ON THIS QUESTIONNAIRE, HOW DIFFICULT HAVE THESE PROBLEMS MADE IT FOR YOU TO DO YOUR WORK, TAKE CARE OF THINGS AT HOME, OR GET ALONG WITH OTHER PEOPLE: SOMEWHAT DIFFICULT
7. FEELING AFRAID AS IF SOMETHING AWFUL MIGHT HAPPEN: SEVERAL DAYS

## 2017-08-24 ASSESSMENT — PATIENT HEALTH QUESTIONNAIRE - PHQ9
5. POOR APPETITE OR OVEREATING: NOT AT ALL
SUM OF ALL RESPONSES TO PHQ QUESTIONS 1-9: 11

## 2017-08-24 NOTE — PROGRESS NOTES
SUBJECTIVE:   Venu Cedeño is a 22 year old male who presents to clinic today for the following health issues:    Lump      Duration: 5 days    Description (location/character/radiation): left side of neck    Intensity:  mild    Accompanying signs and symptoms: itchy    History (similar episodes/previous evaluation): None    Precipitating or alleviating factors: None    Therapies tried and outcome: None       Problem list and histories reviewed & adjusted, as indicated.  Additional history: as documented    Patient Active Problem List   Diagnosis     Major depressive disorder, recurrent episode, mild (H)     WICHO (generalized anxiety disorder)     Psychophysiological insomnia     Past Surgical History:   Procedure Laterality Date     MOUTH SURGERY         Social History   Substance Use Topics     Smoking status: Current Some Day Smoker     Smokeless tobacco: Never Used      Comment: 1 pack per week, e-cigarettes      Alcohol use 4.8 oz/week     8 Standard drinks or equivalent per week     Family History   Problem Relation Age of Onset     DIABETES Maternal Grandmother      Coronary Artery Disease No family hx of      Hypertension No family hx of      Hyperlipidemia No family hx of      CEREBROVASCULAR DISEASE No family hx of      Breast Cancer No family hx of      Colon Cancer No family hx of      Prostate Cancer No family hx of          No Known Allergies  No lab results found.   BP Readings from Last 3 Encounters:   08/24/17 136/79   04/20/17 135/83   10/07/16 136/80    Wt Readings from Last 3 Encounters:   08/24/17 145 lb (65.8 kg)   04/20/17 149 lb (67.6 kg)   10/07/16 155 lb (70.3 kg)                 ROS:  C: NEGATIVE for fever, chills, change in weight  INTEGUMENTARY/SKIN: POSITIVE for nevus that does not appear to be malignant.  E: NEGATIVE for vision changes or irritation  E/M: NEGATIVE for ear, mouth and throat problems  R: NEGATIVE for significant cough or SOB  CV: NEGATIVE for chest pain, palpitations  "or peripheral edema  GI: NEGATIVE for nausea, abdominal pain, heartburn, or change in bowel habits  : NEGATIVE for frequency, dysuria, or hematuria  M: NEGATIVE for significant arthralgias or myalgia  N: NEGATIVE for weakness, dizziness or paresthesias  E: NEGATIVE for temperature intolerance, skin/hair changes  H: NEGATIVE for bleeding problems  P: NEGATIVE for changes in mood or affect    OBJECTIVE:     /79 (BP Location: Left arm, Patient Position: Chair, Cuff Size: Adult Regular)  Pulse 72  Temp 98.4  F (36.9  C) (Oral)  Ht 5' 10.25\" (1.784 m)  Wt 145 lb (65.8 kg)  SpO2 96%  BMI 20.66 kg/m2  Body mass index is 20.66 kg/(m^2).  GENERAL: healthy, alert and no distress  EYES: Eyes grossly normal to inspection  HENT: normal cephalic/atraumatic and oral mucous membranes moist  NECK: no adenopathy, thyroid normal to palpation and hard, palpable lymph node at left posterior cervical triangle.  RESP: lungs clear to auscultation - no rales, rhonchi or wheezes  CV: regular rate and rhythm, normal S1 S2, no S3 or S4, no murmur, click or rub, no peripheral edema and peripheral pulses strong  MS: no gross musculoskeletal defects noted, no edema  SKIN: no suspicious lesions or rashes  NEURO: Normal strength and tone, mentation intact and speech normal  PSYCH: mentation appears normal, affect normal/bright    Diagnostic Test Results:  Results for orders placed or performed in visit on 08/24/17   XR Chest 2 Views    Narrative    CHEST TWO VIEWS  8/24/2017 12:10 PM     HISTORY: Localized enlarged lymph nodes    COMPARISON: None.      Impression    IMPRESSION: Normal.    NANCY BORRERO MD   CBC with platelets differential   Result Value Ref Range    WBC 4.8 4.0 - 11.0 10e9/L    RBC Count 4.65 4.4 - 5.9 10e12/L    Hemoglobin 14.2 13.3 - 17.7 g/dL    Hematocrit 41.1 40.0 - 53.0 %    MCV 88 78 - 100 fl    MCH 30.5 26.5 - 33.0 pg    MCHC 34.5 31.5 - 36.5 g/dL    RDW 12.2 10.0 - 15.0 %    Platelet Count 300 150 - 450 " 10e9/L    Absolute Neutrophil 2.6 1.6 - 8.3 10e9/L    Absolute Lymphocytes 1.6 0.8 - 5.3 10e9/L    Absolute Monocytes 0.3 0.0 - 1.3 10e9/L    Absolute Eosinophils 0.3 0.0 - 0.7 10e9/L    Absolute Basophils 0.0 0.0 - 0.2 10e9/L    Diff Method Manual Differential    Reticulocyte Count   Result Value Ref Range    % Retic 1.5 0.5 - 2.0 %    Absolute Retic 68.5 25 - 95 10e9/L   Blood Morphology Pathologist Review   Result Value Ref Range    Copath Report       Patient Name: ROSA GOODRICH  MR#: [id number]  Specimen #: [id number]  Collected: 8/24/2017  Received: 8/28/2017  Reported: 8/30/2017 12:58  Ordering Phy(s): SHAWNEE BOYKIN    For improved result formatting, select 'View Enhanced Report Format'  under Linked Documents section.    TEST(S):  Peripheral Smear Morphology    FINAL DIAGNOSIS:  Peripheral blood morphology:  - No diagnostic abnormalities.    Electronically signed out by:    Pedro Kelley M.D.    CLINICAL HISTORY:  22 year old male with cervical lymphadenopathy.    PERIPHERAL BLOOD DATA:  PERIPHERAL BLOOD DATA (Date: 08/24/2017)  Patient Value (Reference Range >18 year old male)  4.83    WBC        (4.0-11.0 x 10*9/L)  4.65    RBC         (4.4-5.9 x 10*12/L)  14.2    HGB         (13.3-17.7 g/dL)  41.1    HCT         (40.0-53.0 %)  88.4    MCV        (78-100fL)  30.5    MCH        (26.5-33.0 pg)  34.5    MCHC      (31.5-36.5 g/dL)  12.2    RDW       (10.0-15.0 %)   300    PLT         (150-450 x 10*9/L)    PERIPHERAL BLOOD  DIFFERENTIAL - Manual 200 cells.  (Reference ranges >18 year old)    Percent  53.0%  Neutrophils  34.0%  Lymphocytes   6.0%  Monocytes   6.0%  Eosinophils   1.0%  Basophils    Absolute  2.56   Neutrophils (Ref normal 1.6 - 8.3 x 10*9/L)  1.64   Lymphocytes  (Ref normal 0.8 - 5.3 x 10*9/L)  0.29   Monocytes  (Ref normal 0 -1.3 x 10*9/L)  0.29   Eosinophils  (Ref normal 0 - 0.7 x 10*9/L)  0.05   Basophils  (Ref normal 0 - 0.2 x 10*9/L)    PERIPHERAL BLOOD MORPHOLOGY:    ERYTHROCYTES:   The red cells are normocytic, normochromic and normal in  number for the patient's age and gender.  No significant  anisopoikilocytosis is seen.  No features of hemolysis or increased  polychromasia are identified.  No intracellular microorganisms are  identified.    LEUKOCYTES:  The leukocytes are normal in number, morphology and  differential distribution.  No immature precursors or evidence of  neutrophilic dysplasia is seen.  No atypical lymphoid cells are seen. No  intracellular microorganisms are iden tifoed.    PLATELETS:  The platelets are normal in number and morphology.    CPT Codes:  A: 39238-UFYJ    TESTING LAB LOCATION:  [address] [address]  [phone]    COLLECTION SITE:  Client:  [facility]  Location:  [facility] ([facility])         ASSESSMENT/PLAN:     (R59.0) Cervical lymphadenopathy  (primary encounter diagnosis)  Comment: Unremarkable peripheral morphology, which points to benign cause of lymphadenopathy.  Plan: CBC with platelets differential, Reticulocyte         Count, Blood Morphology Pathologist Review, XR         Chest 2 Views            (D22.9) Nevus  Comment: Recommend Dermatology consultation.  Plan: DERMATOLOGY REFERRAL,    Follow-up visit if condition worsens.    Matthias Patel MD  [facility]

## 2017-08-24 NOTE — PATIENT INSTRUCTIONS
This summary includes the important diagnoses, test, medications and other important parts of your medical history.  Below are a few good we sites you can use to learn more about these.     Www.[place].org : Up to date and easily searchable information on multiple topics.  Www.[email or web address]/c_[email or web address] : [facility] $4.99 medications  Www.medlineplus.gov : medication info, interactive tutorials, watch real surgeries online  Www.familydoctor.org : good info from the Academy of Family Physicians  Www."[employer]inic.[facility] : good info from the [facility]  Www.cdc.gov : public health info, travel advisories, epidemics (H1N1)  Www.aap.org : children's health info, normal development, vaccinations  Www.health.[facility].[place].us : [place] dept of heat, public health issues in [place], N1N1    Based on your medical history and these are the current health maintenance or preventive care services that you are due for (some may have been done at this visit:)  Health Maintenance Due   Topic Date Due     DEPRESSION ACTION PLAN Q1 YR  02/11/2013     TETANUS IMMUNIZATION (SYSTEM ASSIGNED)  07/25/2017     =================================================================================  Normal Values   Blood pressure  <140/90 for most adults    <130/80 for some chronic diseases (ask your care team about yours)    BMI (body mass index)  18.5-25 kg/m2 (based on height and weight)     Thank you for visiting [facility]    Normal or non-critical lab and imaging results will be communicated to you by MyChart, letter or phone within 7 days.  If you do not hear from us within 10 days, please call the clinic. If you have a critical or abnormal lab result, we will notify you by phone as soon as possible.     If you have any questions regarding your visit please contact:     Team Comfort:   Clinic Hours Telephone Number   Dr. Herbert Taylor   7am-5pm   Monday - Friday [phone]  [facility]   Pharmacy 8am-8pm Monday-Thursday      8am-6pm Friday  9am-5pm Saturday-Sunday [phone]   Urgent Care 11am-8pm Monday-Friday        9am-5pm Saturday-Sunday [phone]     After hours, weekend or if you need to make an appointment with your primary provider please call [phone].   After Hours nurse advise: call [facility]: [phone]    Medication Refills:  Call your pharmacy and they will forward the refill to us. Please allow 3 business days for your refills to be completed.    Use [employer] (secure email communication and access to your chart) to send your primary care provider a message or make an appointment. Ask someone on your Team how to sign up for [employer]. To log on to [employer] or for more information in [employer] please visit the website at [email or web address].  As of October 8, 2013, all password changes, disabled accounts, or ID changes in [employer]/MyHealth will be done by our Access Services Department.   If you need help with your account or password, call: [phone]. Clinic staff no longer has the ability to change passwords.     What Is Non-Hodgkin Lymphoma?    Cancer occurs when cells in the body begin to change and multiply out of control. These cells can form lumps of tissue called tumors. Lymphoma is cancer that starts in cells in the body's lymphatic system. Most types of cancer in the lymphatic system are non-Hodgkin lymphoma.  Understanding the lymphatic system  The lymphatic system helps your body fight disease and infections. This system is a network of tubes, or vessels. The vessels pass through tissue all over your body. A clear fluid called lymph flows through the vessels. Lymph helps the body fight infections. Small organs called lymph nodes are also found along this network of vessels. Lymph nodes are found throughout the body. Most are grouped together in the neck, underarms, and groin area. You may feel swollen  or enlarged nodes in these areas when you have a cold.  Some of the body s internal organs are also part of the lymphatic system. These organs include the bone marrow, spleen, thymus, and tonsils. Other organs, such as parts of the digestive tract, also have lymph tissue.  When non-Hodgkin lymphoma forms  The lymphatic system stretches over the whole body. So non-Hodgkin lymphoma can start almost anywhere in your body. Lymphoma can also spread from the lymphatic system to other tissues of the body. This spread is called metastasis.  Treatment options for non-Hodgkin lymphoma  You and your healthcare provider will discuss a treatment plan that s best for your needs. Treatment options may include:    Radiation therapy. This uses focused rays of energy to kill cancer cells.    Chemotherapy. This uses strong medicines to kill cancer cells.    Immunotherapy. This uses the body s own immune system to help fight cancer.    Targeted therapy. This uses medicines that target parts of cancer cells that make them different from normal cells.    Stem cell transplant. This kills cancer cells with high doses of chemotherapy and radiation.  Date Last Reviewed: 7/1/2015 2000-2017 [employer]. [address] [place] [address]. All rights reserved. This information is not intended as a substitute for professional medical care. Always follow your healthcare professional's instructions.        Lymphadenopathy  Lymphadenopathy is swelling of the lymph nodes. Lymph nodes are small, bean-shaped glands around the body.  What are lymph nodes?  Lymph nodes are part of your immune system. The glands are found in your neck, armpits, groin, chest, and abdomen. They act as filters for lymph fluid as it flows through your body. Lymph fluid contains white blood cells and other things that fight infection.  Why lymph nodes swell  Lymphadenopathy is very common. The glands often enlarge during a viral or bacterial infection.  It can happen during a cold, the flu, or strep throat. The nodes may swell in just one area of the body, such as the neck (localized). Or nodes may swell all over the body (generalized). The neck (cervical) lymph nodes are the most common site of lymphadenopathy.  What causes lymphadenopathy?  Dead cells and fluid build up in the lymph nodes as they help fight infection or disease. This causes them to swell in size. Enlarged lymph nodes are often near the source of infection. This can help to find the cause of an infection. For example, swollen lymph nodes around the jaw may be because of an infection in the teeth or mouth. But lymphadenopathy may also be generalized. This is common in some viral illnesses such as mononucleosis or chickenpox (varicella).  Lymphadenopathy can also be caused by:    Infection of a lymph node or small group of nodes (lymphadenitis)    Cancer    Reactions to medicines such as antibiotics and some seizure medicines    Other health conditions, such as lupus  Symptoms of lymphadenopathy  Lymphadenopathy can cause symptoms such as:    Lumps under the jaw, on the sides or back of the neck, in the armpits, in the groin, or in the chest or belly (abdomen)    Pain or tenderness in any of these areas    Redness or warmth in any of these areas  You may also have symptoms from an infection causing the swollen glands. These symptoms may include fever, sore throat, body aches, or cough.  Diagnosing lymphadenopathy  Your health care provider will ask about your health history and symptoms. He or she will give you a physical exam and check the areas where lymph nodes are enlarged. Your health care provider will check the size and location of the nodes, and ask how long they have been swollen and if they are painful. Diagnostic tests and referral to specialists may be recommended. They may include:    Blood tests. These are done to check for signs of infection and other problems.    Urine test. This is  also done to check for infection and other problems.    Chest X-ray. This test can show enlarged lymph nodes or other problems.    Lymph node biopsy. If lymph nodes are swollen for 3 to 4 weeks, they may be checked with a biopsy. Small samples of lymph node tissue are taken and checked in a lab for signs of cancer. You may be referred to a specialist in blood disorders and cancer (hematologist and oncologist).  Treatment for lymphadenopathy  The treatment of enlarged lymph nodes depends on the cause. Enlarged lymph nodes are often harmless and go away without any treatment. Treatment is most often done on the cause of the enlarged nodes and may include:    Antibiotic medicine to treat a bacterial infection    Incision and drainage (I & D) of a lymph node for lymphadenitis    Other medicines or procedures to treat the cause of the enlarged nodes  You may need follow-up exam in 3 to 4 weeks to recheck enlarged nodes.     When to call your health care provider  Call your health care provider if you have lymph nodes that are still swollen after 3 to 4 weeks.   Date Last Reviewed: 6/19/2015 2000-2017 [employer]. [address] [place] [address], [place] [address]. All rights reserved. This information is not intended as a substitute for professional medical care. Always follow your healthcare professional's instructions.

## 2017-08-24 NOTE — MR AVS SNAPSHOT
After Visit Summary   8/24/2017    Venu Cedeño    MRN: [id number]           Patient Information     Date Of Birth          1995        Visit Information        Provider Department      8/24/2017 11:20 AM Matthias Patel MD [facility]        Today's Diagnoses     Cervical lymphadenopathy    -  1    Nevus          Care Instructions    This summary includes the important diagnoses, test, medications and other important parts of your medical history.  Below are a few good we sites you can use to learn more about these.     Www.[facility].org : Up to date and easily searchable information on multiple topics.  Www.[facility].[employer]/Pharmacy/c_539084.asp : [facility] $4.99 medications  Www.[employer].gov : medication info, interactive tutorials, watch real surgeries online  Www.familydoctor.org : good info from the Academy of Family Physicians  Www.Feedback[employer]Machine : good info from the [facility]  Www.cdc.gov : public health info, travel advisories, epidemics (H1N1)  Www.aap.org : children's health info, normal development, vaccinations  Www.health.[facility].[place].us : [place] dept of heatlh, public health issues in [place], N1N1    Based on your medical history and these are the current health maintenance or preventive care services that you are due for (some may have been done at this visit:)  Health Maintenance Due   Topic Date Due     DEPRESSION ACTION PLAN Q1 YR  02/11/2013     TETANUS IMMUNIZATION (SYSTEM ASSIGNED)  07/25/2017     =================================================================================  Normal Values   Blood pressure  <140/90 for most adults    <130/80 for some chronic diseases (ask your care team about yours)    BMI (body mass index)  18.5-25 kg/m2 (based on height and weight)     Thank you for visiting [facility]    Normal or non-critical lab and imaging results will be communicated to you by MyChart, letter or phone within 7 days.  If  you do not hear from us within 10 days, please call the clinic. If you have a critical or abnormal lab result, we will notify you by phone as soon as possible.     If you have any questions regarding your visit please contact:     Team Comfort:   Clinic Hours Telephone Number   Dr. Herbert Saucedo  [facility]   7am-5pm  Monday - Friday [phone]  [facility]   Pharmacy 8am-8pm Monday-Thursday      8am-6pm Friday  9am-5pm Saturday-Sunday [phone]   Urgent Care 11am-8pm Monday-Friday        9am-5pm Saturday-Sunday [phone]     After hours, weekend or if you need to make an appointment with your primary provider please call [phone].   After Hours nurse advise: call [facility]: [phone]    Medication Refills:  Call your pharmacy and they will forward the refill to us. Please allow 3 business days for your refills to be completed.    Use [employer] (secure email communication and access to your chart) to send your primary care provider a message or make an appointment. Ask someone on your Team how to sign up for [employer] To log on to [employer] or for more information in [employer] please visit the website at [email or web address]  As of October 8, 2013, all password changes, disabled accounts, or ID changes in [employer]MyHealth will be done by our Access Services Department.   If you need help with your account or password, call: [phone]. Clinic staff no longer has the ability to change passwords.     What Is Non-Hodgkin Lymphoma?    Cancer occurs when cells in the body begin to change and multiply out of control. These cells can form lumps of tissue called tumors. Lymphoma is cancer that starts in cells in the body's lymphatic system. Most types of cancer in the lymphatic system are non-Hodgkin lymphoma.  Understanding the lymphatic system  The lymphatic system helps your body fight disease and infections. This system is  a network of tubes, or vessels. The vessels pass through tissue all over your body. A clear fluid called lymph flows through the vessels. Lymph helps the body fight infections. Small organs called lymph nodes are also found along this network of vessels. Lymph nodes are found throughout the body. Most are grouped together in the neck, underarms, and groin area. You may feel swollen or enlarged nodes in these areas when you have a cold.  Some of the body s internal organs are also part of the lymphatic system. These organs include the bone marrow, spleen, thymus, and tonsils. Other organs, such as parts of the digestive tract, also have lymph tissue.  When non-Hodgkin lymphoma forms  The lymphatic system stretches over the whole body. So non-Hodgkin lymphoma can start almost anywhere in your body. Lymphoma can also spread from the lymphatic system to other tissues of the body. This spread is called metastasis.  Treatment options for non-Hodgkin lymphoma  You and your healthcare provider will discuss a treatment plan that s best for your needs. Treatment options may include:    Radiation therapy. This uses focused rays of energy to kill cancer cells.    Chemotherapy. This uses strong medicines to kill cancer cells.    Immunotherapy. This uses the body s own immune system to help fight cancer.    Targeted therapy. This uses medicines that target parts of cancer cells that make them different from normal cells.    Stem cell transplant. This kills cancer cells with high doses of chemotherapy and radiation.  Date Last Reviewed: 7/1/2015 2000-2017 [employer]. [address] [place] [address]. All rights reserved. This information is not intended as a substitute for professional medical care. Always follow your healthcare professional's instructions.        Lymphadenopathy  Lymphadenopathy is swelling of the lymph nodes. Lymph nodes are small, bean-shaped glands around the body.  What are lymph  nodes?  Lymph nodes are part of your immune system. The glands are found in your neck, armpits, groin, chest, and abdomen. They act as filters for lymph fluid as it flows through your body. Lymph fluid contains white blood cells and other things that fight infection.  Why lymph nodes swell  Lymphadenopathy is very common. The glands often enlarge during a viral or bacterial infection. It can happen during a cold, the flu, or strep throat. The nodes may swell in just one area of the body, such as the neck (localized). Or nodes may swell all over the body (generalized). The neck (cervical) lymph nodes are the most common site of lymphadenopathy.  What causes lymphadenopathy?  Dead cells and fluid build up in the lymph nodes as they help fight infection or disease. This causes them to swell in size. Enlarged lymph nodes are often near the source of infection. This can help to find the cause of an infection. For example, swollen lymph nodes around the jaw may be because of an infection in the teeth or mouth. But lymphadenopathy may also be generalized. This is common in some viral illnesses such as mononucleosis or chickenpox (varicella).  Lymphadenopathy can also be caused by:    Infection of a lymph node or small group of nodes (lymphadenitis)    Cancer    Reactions to medicines such as antibiotics and some seizure medicines    Other health conditions, such as lupus  Symptoms of lymphadenopathy  Lymphadenopathy can cause symptoms such as:    Lumps under the jaw, on the sides or back of the neck, in the armpits, in the groin, or in the chest or belly (abdomen)    Pain or tenderness in any of these areas    Redness or warmth in any of these areas  You may also have symptoms from an infection causing the swollen glands. These symptoms may include fever, sore throat, body aches, or cough.  Diagnosing lymphadenopathy  Your health care provider will ask about your health history and symptoms. He or she will give you a  physical exam and check the areas where lymph nodes are enlarged. Your health care provider will check the size and location of the nodes, and ask how long they have been swollen and if they are painful. Diagnostic tests and referral to specialists may be recommended. They may include:    Blood tests. These are done to check for signs of infection and other problems.    Urine test. This is also done to check for infection and other problems.    Chest X-ray. This test can show enlarged lymph nodes or other problems.    Lymph node biopsy. If lymph nodes are swollen for 3 to 4 weeks, they may be checked with a biopsy. Small samples of lymph node tissue are taken and checked in a lab for signs of cancer. You may be referred to a specialist in blood disorders and cancer (hematologist and oncologist).  Treatment for lymphadenopathy  The treatment of enlarged lymph nodes depends on the cause. Enlarged lymph nodes are often harmless and go away without any treatment. Treatment is most often done on the cause of the enlarged nodes and may include:    Antibiotic medicine to treat a bacterial infection    Incision and drainage (I & D) of a lymph node for lymphadenitis    Other medicines or procedures to treat the cause of the enlarged nodes  You may need follow-up exam in 3 to 4 weeks to recheck enlarged nodes.     When to call your health care provider  Call your health care provider if you have lymph nodes that are still swollen after 3 to 4 weeks.   Date Last Reviewed: 6/19/2015 2000-2017 [employer]. [address] [place] [address]. All rights reserved. This information is not intended as a substitute for professional medical care. Always follow your healthcare professional's instructions.                Follow-ups after your visit        Additional Services     DERMATOLOGY REFERRAL       Your provider has referred you to: [facility]: [facility] [phone]    "[email or web address]    Please be aware that coverage of these services is subject to the terms and limitations of your health insurance plan.  Call member services at your health plan with any benefit or coverage questions.      Please bring the following with you to your appointment:    (1) Any X-Rays, CTs or MRIs which have been performed.  Contact the facility where they were done to arrange for  prior to your scheduled appointment.    (2) List of current medications  (3) This referral request   (4) Any documents/labs given to you for this referral                  Who to contact     If you have questions or need follow up information about today's clinic visit or your schedule please contact [facility] directly at [phone].  Normal or non-critical lab and imaging results will be communicated to you by MyChart, letter or phone within 4 business days after the clinic has received the results. If you do not hear from us within 7 days, please contact the clinic through MyChart or phone. If you have a critical or abnormal lab result, we will notify you by phone as soon as possible.  Submit refill requests through This Week In or call your pharmacy and they will forward the refill request to us. Please allow 3 business days for your refill to be completed.          Additional Information About Your Visit        This Week In Information     This Week In lets you send messages to your doctor, view your test results, renew your prescriptions, schedule appointments and more. To sign up, go to [email or web address]/This Week In . Click on \"Log in\" on the left side of the screen, which will take you to the Welcome page. Then click on \"Sign up Now\" on the right side of the page.     You will be asked to enter the access code listed below, as well as some personal information. Please follow the directions to create your username and password.     Your access code is: " [email or web address]  Expires: 2017 11:56 AM     Your access code will  in 90 days. If you need help or a new code, please call your [facility] or [phone].        Care EveryWhere ID     This is your Care EveryWhere ID. This could be used by other organizations to access your [place] medical records  [id number]        Your Vitals Were     Pulse Temperature Height Pulse Oximetry BMI (Body Mass Index)       72 98.4  F (36.9  C) (Oral) 5' 10.25\" (1.784 m) 96% 20.66 kg/m2        Blood Pressure from Last 3 Encounters:   17 136/79   17 135/83   10/07/16 136/80    Weight from Last 3 Encounters:   17 145 lb (65.8 kg)   17 149 lb (67.6 kg)   10/07/16 155 lb (70.3 kg)              We Performed the Following     Blood Morphology Pathologist Review     CBC with platelets differential     DERMATOLOGY REFERRAL     Reticulocyte Count     XR Chest 2 Views        Primary Care Provider Office Phone # Fax #    Matthias Patel [phone]       [address] [address] [facility] [address]        Equal Access to Services     NEO BENDER AH: Hadii aad ku hadasho Soomaali, waaxda luqadaha, qaybta kaalmada adeegyada, waxay idiin haywilliamn gaston kelsey ladavi . So [facility] [phone].    ATENCIÓN: Si habla español, tiene a rosales disposición servicios gratuitos de asistencia lingüística. Llame al [phone].    We comply with applicable federal civil rights laws and [place] laws. We do not discriminate on the basis of race, color, national origin, age, disability sex, sexual orientation or gender identity.            Thank you!     Thank you for choosing [facility]  for your care. Our goal is always to provide you with excellent care. Hearing back from our patients is one way we can continue to improve our services. Please take a few minutes to complete the written survey that you may receive in the mail after your visit with us. Thank you!             Your Updated " Medication List - Protect others around you: Learn how to safely use, store and throw away your medicines at www.disposemymeds.org.      Notice  As of 8/24/2017 11:59 AM    You have not been prescribed any medications.

## 2017-08-25 ASSESSMENT — ANXIETY QUESTIONNAIRES: GAD7 TOTAL SCORE: 5

## 2017-08-30 LAB
BASOPHILS # BLD AUTO: 0 10E9/L (ref 0–0.2)
COPATH REPORT: NORMAL
DIFFERENTIAL METHOD BLD: NORMAL
EOSINOPHIL # BLD AUTO: 0.3 10E9/L (ref 0–0.7)
ERYTHROCYTE [DISTWIDTH] IN BLOOD BY AUTOMATED COUNT: 12.2 % (ref 10–15)
HCT VFR BLD AUTO: 41.1 % (ref 40–53)
HGB BLD-MCNC: 14.2 G/DL (ref 13.3–17.7)
LYMPHOCYTES # BLD AUTO: 1.6 10E9/L (ref 0.8–5.3)
MCH RBC QN AUTO: 30.5 PG (ref 26.5–33)
MCHC RBC AUTO-ENTMCNC: 34.5 G/DL (ref 31.5–36.5)
MCV RBC AUTO: 88 FL (ref 78–100)
MONOCYTES # BLD AUTO: 0.3 10E9/L (ref 0–1.3)
NEUTROPHILS # BLD AUTO: 2.6 10E9/L (ref 1.6–8.3)
PLATELET # BLD AUTO: 300 10E9/L (ref 150–450)
RBC # BLD AUTO: 4.65 10E12/L (ref 4.4–5.9)
WBC # BLD AUTO: 4.8 10E9/L (ref 4–11)

## 2017-09-01 ENCOUNTER — PRE VISIT (OUTPATIENT)
Dept: DERMATOLOGY | Facility: CLINIC | Age: 22
End: 2017-09-01

## 2017-09-01 NOTE — TELEPHONE ENCOUNTER
VM identifies patient. Left message for patient regarding upcoming appointment on 9-8-17 at 9:30am.  Informed patient to bring an updated list of allergies, medications, pharmacy details and insurance information. Asked patient to bring any dermatology records from outside [place] or the [facility] or have them faxed to [phone].    Patient Reminders Given:  --Plan on being in our facility for approximately one hour, this includes the registration process, office visit, education and check-out process.    --We are located on the second floor of the building, check in desk 4.   --If you need to cancel or reschedule, call [phone].  --We look forward to seeing you in Dermatology Clinic.     Sharita Daily LPN

## 2017-09-04 PROBLEM — R59.0 CERVICAL LYMPHADENOPATHY: Status: ACTIVE | Noted: 2017-09-04

## 2017-09-08 ENCOUNTER — OFFICE VISIT (OUTPATIENT)
Dept: DERMATOLOGY | Facility: CLINIC | Age: 22
End: 2017-09-08
Attending: INTERNAL MEDICINE
Payer: COMMERCIAL

## 2017-09-08 DIAGNOSIS — D22.9 MULTIPLE BENIGN NEVI: Primary | ICD-10-CM

## 2017-09-08 PROCEDURE — 99203 OFFICE O/P NEW LOW 30 MIN: CPT | Performed by: DERMATOLOGY

## 2017-09-08 NOTE — LETTER
9/8/2017       RE: Venu Cedeño  [address] [facility] [address]     Dear Colleague,    Thank you for referring your patient, Venu Cedeño, to the [facility] at [facility]. Please see a copy of my visit note below.    [facility] Dermatology Note      Dermatology Problem List:  1.New Patient    CC:   Chief Complaint   Patient presents with     Skin Check     Encounter Date: Sep 8, 2017    History of Present Illness:  Mr. Venu Cedeño is a 22 year old male who presents as a referral from Dr. Garcia for a skin check.    He is concerned about lumps he can sometimes see and feel on the left side of his neck. His primary MD ordered some blood testing, but it was unrevealing. The bumps are visible at times and feel pretty superficial. He's not felt ill.     He would also like all of his moles checked today. He hasn't noticed any that are specifically bothersome or symptomatic.     He generally avoids sun exposure, but does try to wear sunscreen if he knows he will be outdoors.       Past Medical History:   Patient Active Problem List   Diagnosis     Major depressive disorder, recurrent episode, mild (H)     WICHO (generalized anxiety disorder)     Psychophysiological insomnia     Cervical lymphadenopathy     Past Medical History:   Diagnosis Date     NO ACTIVE PROBLEMS      Past Surgical History:   Procedure Laterality Date     MOUTH SURGERY         Social History:  The patient works at a [employer] for [employer]. The patient denies use of tanning beds.    Family History:  There is no family history of skin cancer.    Medications:  No current outpatient prescriptions on file.     No Known Allergies      Review of Systems:  -As per HPI  -Constitutional: The patient denies fatigue, fevers, chills, unintended weight loss, and night sweats.  -Skin: As above in HPI. No additional skin concerns.    Physical exam:  Vitals: There were  no vitals taken for this visit.  GEN: This is a well developed, well-nourished male in no acute distress, in a pleasant mood.   SKIN: Total skin excluding the undergarment areas was performed. The exam included the head/face, neck, both arms, chest, back, abdomen, both legs, digits and/or nails.   - No other lesions of concern on areas examined.     - Multiple regular brown pigmented macules and dome shaped papules are identified on the face, neck, trunk and exremeties.   - 0.5 cm left cervical lymph node palpable in deep tissue, posterior to SCM, anterior to trapezius.  - soft compressible papule on L lateral neck    Impression/Plan:  1. Multiple clinically benign nevi on the face, neck, trunk and extremeties    No further intervention required. Patient to report changes.  2.   L cervical Lymph node - the lymph node was difficult to palpate, but appeared normal in size.    I reassured the patient as he is otherwise asymptomatic.    Should the lymph node enlarge, become painful, or several become apparent, an ultrasound and biopsy may be indicated. Exam didn't reveal any other nodules or masses such as cysts or skin tumors. He did have some compressible vessels superficially on his neck that appeared within normal limits.     Follow-up in 2 years for skin cancer screening, earlier for new or changing lesions.  Thank you for the kind referral and the opportunity to participate in the care of your patient.     Ton Rich DO    Department of Dermatology  [facility]: Phone: [phone], Fax:[phone]  [facility]: Phone: [phone], Fax: [phone]

## 2017-09-08 NOTE — PROGRESS NOTES
[facility] Dermatology Note      Dermatology Problem List:  1.New Patient    CC:   Chief Complaint   Patient presents with     Skin Check     Encounter Date: Sep 8, 2017    History of Present Illness:  Mr. Venu Cedeño is a 22 year old male who presents as a referral from Dr. Garcia for a skin check.    He is concerned about lumps he can sometimes see and feel on the left side of his neck. His primary MD ordered some blood testing, but it was unrevealing. The bumps are visible at times and feel pretty superficial. He's not felt ill.     He would also like all of his moles checked today. He hasn't noticed any that are specifically bothersome or symptomatic.     He generally avoids sun exposure, but does try to wear sunscreen if he knows he will be outdoors.       Past Medical History:   Patient Active Problem List   Diagnosis     Major depressive disorder, recurrent episode, mild (H)     WICHO (generalized anxiety disorder)     Psychophysiological insomnia     Cervical lymphadenopathy     Past Medical History:   Diagnosis Date     NO ACTIVE PROBLEMS      Past Surgical History:   Procedure Laterality Date     MOUTH SURGERY         Social History:  The patient works at a [employer] for [employer]. The patient denies use of tanning beds.    Family History:  There is no family history of skin cancer.    Medications:  No current outpatient prescriptions on file.     No Known Allergies      Review of Systems:  -As per HPI  -Constitutional: The patient denies fatigue, fevers, chills, unintended weight loss, and night sweats.  -Skin: As above in HPI. No additional skin concerns.    Physical exam:  Vitals: There were no vitals taken for this visit.  GEN: This is a well developed, well-nourished male in no acute distress, in a pleasant mood.   SKIN: Total skin excluding the undergarment areas was performed. The exam included the head/face, neck, both arms, chest, back, abdomen, both legs, digits and/or  nails.   - No other lesions of concern on areas examined.     - Multiple regular brown pigmented macules and dome shaped papules are identified on the face, neck, trunk and exremeties.   - 0.5 cm left cervical lymph node palpable in deep tissue, posterior to SCM, anterior to trapezius.  - soft compressible papule on L lateral neck    Impression/Plan:  1. Multiple clinically benign nevi on the face, neck, trunk and extremeties    No further intervention required. Patient to report changes.  2.  L cervical Lymph node - the lymph node was difficult to palpate, but appeared normal in size.     I reassured the patient as he is otherwise asymptomatic.     Should the lymph node enlarge, become painful, or several become apparent, an ultrasound may be indicated.     Exam didn't reveal any other nodules or masses such as cysts or skin tumors. He did have some compressible vessels superficially on his neck that appeared within normal limits.     Follow-up in 2 years for skin cancer screening, earlier for new or changing lesions.       Staff Involved:  Staff/Scribe    Scribe Disclosure:   I, Candy Kerr, am serving as a scribe to document services personally performed by Dr. Ton Rich, based on data collection and the provider's statements to me.     Provider Disclosure:   The documentation recorded by the scribe accurately reflects the services I personally performed and the decisions made by me.    Ton Rich DO    Department of Dermatology  [facility]: Phone: [phone], Fax:[phone]  [facility]: Phone: [phone], Fax: [phone]

## 2017-09-08 NOTE — LETTER
9/8/2017      RE: Venu Cedeño  [address] [facility] [address]       [facility] Dermatology Note      Dermatology Problem List:  1.New Patient    CC:   Chief Complaint   Patient presents with     Skin Check     Encounter Date: Sep 8, 2017    History of Present Illness:  Mr. Venu Cedeño is a 22 year old male who presents as a referral from Dr. Garcia for a skin check.    He is concerned about lumps he can sometimes see and feel on the left side of his neck. His primary MD ordered some blood testing, but it was unrevealing. The bumps are visible at times and feel pretty superficial. He's not felt ill.     He would also like all of his moles checked today. He hasn't noticed any that are specifically bothersome or symptomatic.     He generally avoids sun exposure, but does try to wear sunscreen if he knows he will be outdoors.       Past Medical History:   Patient Active Problem List   Diagnosis     Major depressive disorder, recurrent episode, mild (H)     WICHO (generalized anxiety disorder)     Psychophysiological insomnia     Cervical lymphadenopathy     Past Medical History:   Diagnosis Date     NO ACTIVE PROBLEMS      Past Surgical History:   Procedure Laterality Date     MOUTH SURGERY         Social History:  The patient works at a [employer]. The patient denies use of tanning beds.    Family History:  There is no family history of skin cancer.    Medications:  No current outpatient prescriptions on file.     No Known Allergies      Review of Systems:  -As per HPI  -Constitutional: The patient denies fatigue, fevers, chills, unintended weight loss, and night sweats.  -Skin: As above in HPI. No additional skin concerns.    Physical exam:  Vitals: There were no vitals taken for this visit.  GEN: This is a well developed, well-nourished male in no acute distress, in a pleasant mood.   SKIN: Total skin excluding the undergarment areas was performed. The exam  included the head/face, neck, both arms, chest, back, abdomen, both legs, digits and/or nails.   - No other lesions of concern on areas examined.     - Multiple regular brown pigmented macules and dome shaped papules are identified on the face, neck, trunk and exremeties.   - 0.5 cm left cervical lymph node palpable in deep tissue, posterior to SCM, anterior to trapezius.  - soft compressible papule on L lateral neck    Impression/Plan:  1. Multiple clinically benign nevi on the face, neck, trunk and extremeties    No further intervention required. Patient to report changes.  2.  L cervical Lymph node - the lymph node was difficult to palpate, but appeared normal in size.     I reassured the patient as he is otherwise asymptomatic.     Should the lymph node enlarge, become painful, or several become apparent, an ultrasound may be indicated.     Exam didn't reveal any other nodules or masses such as cysts or skin tumors. He did have some compressible vessels superficially on his neck that appeared within normal limits.     Follow-up in 2 years for skin cancer screening, earlier for new or changing lesions.       Staff Involved:  Staff/Scribe    Scribe Disclosure:   I, Candy Kerr, am serving as a scribe to document services personally performed by Dr. Ton Rich, based on data collection and the provider's statements to me.     Provider Disclosure:   The documentation recorded by the scribe accurately reflects the services I personally performed and the decisions made by me.    Ton Rich DO    Department of Dermatology  [facility]: Phone: [phone], Fax:[phone]  [facility]: Phone: [phone], Fax: [phone]    Ton Rich MD

## 2017-09-08 NOTE — MR AVS SNAPSHOT
After Visit Summary   9/8/2017    Venu Cedeño    MRN: [id number]           Patient Information     Date Of Birth          1995        Visit Information        Provider Department      9/8/2017 9:30 AM Ton Rich MD [facility]        Care Instructions                    Follow-ups after your visit        Who to contact     If you have questions or need follow up information about today's clinic visit or your schedule please contact [facility] directly at [phone].  Normal or non-critical lab and imaging results will be communicated to you by MyChart, letter or phone within 4 business days after the clinic has received the results. If you do not hear from us within 7 days, please contact the clinic through [facility] or phone. If you have a critical or abnormal lab result, we will notify you by phone as soon as possible.  Submit refill requests through [facility] or call your pharmacy and they will forward the refill request to us. Please allow 3 business days for your refill to be completed.          Additional Information About Your Visit        MyChart Information     [facility] gives you secure access to your electronic health record. If you see a primary care provider, you can also send messages to your care team and make appointments. If you have questions, please call your primary care clinic.  If you do not have a primary care provider, please call [phone] and they will assist you.      [facility] is an electronic gateway that provides easy, online access to your medical records. With [facility], you can request a clinic appointment, read your test results, renew a prescription or communicate with your care team.     To access your existing account, please contact your [facility] or call [phone] for assistance.        Care EveryWhere ID     This is your Care EveryWhere ID. This could be used by other organizations to  access your [place] medical records  [id number]         Blood Pressure from Last 3 Encounters:   08/24/17 136/79   04/20/17 135/83   10/07/16 136/80    Weight from Last 3 Encounters:   08/24/17 65.8 kg (145 lb)   04/20/17 67.6 kg (149 lb)   10/07/16 70.3 kg (155 lb)              Today, you had the following     No orders found for display       Primary Care Provider Office Phone # Fax #    Matthias Patel [phone]       [address]  [facility] [address]        Equal Access to Services     [facility]: Hadii aad ku hadasho Soomaali, waaxda luqadaha, qaybta kaalmada adeegyada, ankur guzman . So [facility] [phone].    ATENCIÓN: Si habla español, tiene a rosales disposición servicios gratuitos de asistencia lingüística. Llame al [phone].    We comply with applicable federal civil rights laws and [place] laws. We do not discriminate on the basis of race, color, national origin, age, disability sex, sexual orientation or gender identity.            Thank you!     Thank you for choosing [facility]  for your care. Our goal is always to provide you with excellent care. Hearing back from our patients is one way we can continue to improve our services. Please take a few minutes to complete the written survey that you may receive in the mail after your visit with us. Thank you!             Your Updated Medication List - Protect others around you: Learn how to safely use, store and throw away your medicines at www.disposemymeds.org.      Notice  As of 9/8/2017 10:33 AM    You have not been prescribed any medications.

## 2017-09-08 NOTE — NURSING NOTE
Dermatology Rooming Note    Venu Cedeño's goals for this visit include:   Chief Complaint   Patient presents with     Skin Check       Is a scribe okay for this visit:YES    Are records needed for this visit(If yes, obtain release of information): No     Vitals: There were no vitals taken for this visit.    Referring Provider:  Matthias Patel MD  [address] [place] [address]    Sharita Daily LPN

## 2019-01-17 ENCOUNTER — OFFICE VISIT (OUTPATIENT)
Dept: FAMILY MEDICINE | Facility: CLINIC | Age: 24
End: 2019-01-17
Payer: COMMERCIAL

## 2019-01-17 VITALS
TEMPERATURE: 98.1 F | HEART RATE: 100 BPM | DIASTOLIC BLOOD PRESSURE: 86 MMHG | SYSTOLIC BLOOD PRESSURE: 112 MMHG | WEIGHT: 144 LBS | OXYGEN SATURATION: 98 % | HEIGHT: 70 IN | BODY MASS INDEX: 20.62 KG/M2 | RESPIRATION RATE: 16 BRPM

## 2019-01-17 DIAGNOSIS — R10.13 ABDOMINAL PAIN, EPIGASTRIC: Primary | ICD-10-CM

## 2019-01-17 DIAGNOSIS — Z78.9 VEGAN DIET: ICD-10-CM

## 2019-01-17 DIAGNOSIS — Z23 NEED FOR VACCINATION: ICD-10-CM

## 2019-01-17 LAB
ALBUMIN SERPL-MCNC: 4.5 G/DL (ref 3.4–5)
ALP SERPL-CCNC: 78 U/L (ref 40–150)
ALT SERPL W P-5'-P-CCNC: 41 U/L (ref 0–70)
ANION GAP SERPL CALCULATED.3IONS-SCNC: 8 MMOL/L (ref 3–14)
AST SERPL W P-5'-P-CCNC: 20 U/L (ref 0–45)
BASOPHILS # BLD AUTO: 0 10E9/L (ref 0–0.2)
BASOPHILS NFR BLD AUTO: 0.5 %
BILIRUB SERPL-MCNC: 0.4 MG/DL (ref 0.2–1.3)
BUN SERPL-MCNC: 6 MG/DL (ref 7–30)
CALCIUM SERPL-MCNC: 9.8 MG/DL (ref 8.5–10.1)
CHLORIDE SERPL-SCNC: 105 MMOL/L (ref 94–109)
CO2 SERPL-SCNC: 27 MMOL/L (ref 20–32)
CREAT SERPL-MCNC: 0.88 MG/DL (ref 0.66–1.25)
DIFFERENTIAL METHOD BLD: NORMAL
EOSINOPHIL # BLD AUTO: 0.1 10E9/L (ref 0–0.7)
EOSINOPHIL NFR BLD AUTO: 1.4 %
ERYTHROCYTE [DISTWIDTH] IN BLOOD BY AUTOMATED COUNT: 12.8 % (ref 10–15)
FOLATE SERPL-MCNC: 23 NG/ML
GFR SERPL CREATININE-BSD FRML MDRD: >90 ML/MIN/{1.73_M2}
GLUCOSE SERPL-MCNC: 113 MG/DL (ref 70–99)
HCT VFR BLD AUTO: 46.1 % (ref 40–53)
HGB BLD-MCNC: 15.7 G/DL (ref 13.3–17.7)
LIPASE SERPL-CCNC: 134 U/L (ref 73–393)
LYMPHOCYTES # BLD AUTO: 0.9 10E9/L (ref 0.8–5.3)
LYMPHOCYTES NFR BLD AUTO: 21 %
MCH RBC QN AUTO: 30.3 PG (ref 26.5–33)
MCHC RBC AUTO-ENTMCNC: 34.1 G/DL (ref 31.5–36.5)
MCV RBC AUTO: 89 FL (ref 78–100)
MONOCYTES # BLD AUTO: 0.2 10E9/L (ref 0–1.3)
MONOCYTES NFR BLD AUTO: 3.8 %
NEUTROPHILS # BLD AUTO: 3.1 10E9/L (ref 1.6–8.3)
NEUTROPHILS NFR BLD AUTO: 73.3 %
PLATELET # BLD AUTO: 352 10E9/L (ref 150–450)
POTASSIUM SERPL-SCNC: 4.1 MMOL/L (ref 3.4–5.3)
PROT SERPL-MCNC: 7.9 G/DL (ref 6.8–8.8)
RBC # BLD AUTO: 5.18 10E12/L (ref 4.4–5.9)
SODIUM SERPL-SCNC: 140 MMOL/L (ref 133–144)
VIT B12 SERPL-MCNC: 696 PG/ML (ref 193–986)
WBC # BLD AUTO: 4.2 10E9/L (ref 4–11)

## 2019-01-17 PROCEDURE — 99214 OFFICE O/P EST MOD 30 MIN: CPT | Mod: 25 | Performed by: PHYSICIAN ASSISTANT

## 2019-01-17 PROCEDURE — 82306 VITAMIN D 25 HYDROXY: CPT | Performed by: PHYSICIAN ASSISTANT

## 2019-01-17 PROCEDURE — 90471 IMMUNIZATION ADMIN: CPT | Performed by: PHYSICIAN ASSISTANT

## 2019-01-17 PROCEDURE — 90715 TDAP VACCINE 7 YRS/> IM: CPT | Performed by: PHYSICIAN ASSISTANT

## 2019-01-17 PROCEDURE — 83690 ASSAY OF LIPASE: CPT | Performed by: PHYSICIAN ASSISTANT

## 2019-01-17 PROCEDURE — 85025 COMPLETE CBC W/AUTO DIFF WBC: CPT | Performed by: PHYSICIAN ASSISTANT

## 2019-01-17 PROCEDURE — 80053 COMPREHEN METABOLIC PANEL: CPT | Performed by: PHYSICIAN ASSISTANT

## 2019-01-17 PROCEDURE — 82607 VITAMIN B-12: CPT | Performed by: PHYSICIAN ASSISTANT

## 2019-01-17 PROCEDURE — 82746 ASSAY OF FOLIC ACID SERUM: CPT | Performed by: PHYSICIAN ASSISTANT

## 2019-01-17 PROCEDURE — 36415 COLL VENOUS BLD VENIPUNCTURE: CPT | Performed by: PHYSICIAN ASSISTANT

## 2019-01-17 ASSESSMENT — PAIN SCALES - GENERAL: PAINLEVEL: NO PAIN (0)

## 2019-01-17 ASSESSMENT — MIFFLIN-ST. JEOR: SCORE: 1658.4

## 2019-01-17 NOTE — PATIENT INSTRUCTIONS
At [facility], we strive to deliver an exceptional experience to you, every time we see you.  If you receive a survey in the mail, please send us back your thoughts. We really do value your feedback.    Based on your medical history, these are the current health maintenance/preventive care services that you are due for (some may have been done at this visit.)  Health Maintenance Due   Topic Date Due     DTAP/TDAP/TD IMMUNIZATION (2 - Td) 08/22/2007     DEPRESSION ACTION PLAN  02/11/2013     PHQ-9 Q6 MONTHS  02/24/2018     INFLUENZA VACCINE (1) 09/01/2018         Suggested websites for health information:  Www.[place].org : Up to date and easily searchable information on multiple topics.  Www.medlineplus.gov : medication info, interactive tutorials, watch real surgeries online  Www.familydoctor.org : good info from the Academy of Family Physicians  Www.cdc.gov : public health info, travel advisories, epidemics (H1N1)  Www.aap.org : children's health info, normal development, vaccinations  Www.health.[facility].[place].us : [place] dept of health, public health issues in [place], N1N1    Your care team:                            Family Medicine Internal Medicine   MD Matthias Tineo MD Shantel Branch-Fleming, MD Katya Georgiev PA-C Nam Ho, MD Pediatrics   ZULAY Ramirez, CHARI Kang APRELISHA CNP   MD Gwen Brooks MD Deborah Mielke, MD Kim Thein, APRN [facility]      Clinic hours: Monday - Thursday 7 am-7 pm; Fridays 7 am-5 pm.   Urgent care: Monday - Friday 11 am-9 pm; Saturday and Sunday 9 am-5 pm.  Pharmacy : Monday -Thursday 8 am-8 pm; Friday 8 am-6 pm; Saturday and Sunday 9 am-5 pm.     Clinic: [phone]   Pharmacy: [phone]    Patient Education     Epigastric Pain (Uncertain Cause)     Epigastric pain is pain in the upper abdomen. It can be a sign of disease. Common causes include:    Acid reflux (stomach acid flowing up into the  esophagus)    Gastritis (irritation of the stomach lining) Most often this is from aspirin or NSAID medicines such as ibuprofen, bacteria called H. pylori, or frequent alcohol use.    Peptic ulcer disease    Inflammation of the pancreas    Gallstone    Infection in the gallbladder  Pain may be dull or burning. It may spread upward to the chest or to the back. There may be other symptoms such as belching, bloating, cramps or hunger pains. There may be weight loss or poor appetite, nausea or vomiting.  Since the cause of your pain is not certain yet,you may need more tests. Sometimes the doctor will treat you for the most likely condition to see if there is improvement before doing more tests.  Home care  Medicines    Antacids help neutralize the normal acids in your stomach.If you don t like the liquid, you can try a chewable one. You may find one works better than another for you. Overuse can cause diarrhea or constipation.    Acid blockers (H2 blockers) decrease acid production. Examples are cimetidine, famotidine, and ranitidine.    Acid inhibitors (PPIs) decrease acid production in a different way than the blockers. You may find they work better, but can take a little longer to take effect.  Examples are omeprazole, lansoprazole, pantoprazole, rabeprazole, and esomeprazole. Many of these are available over-the-counter or available as generics.    Take an antacid 30 to 60 minutes after eating and at bedtime, but not at the same time as an acid blocker.    Try not to take NSAIDs such as ibuprofen. Aspirin may also cause problems, but if taking it for your heart or other medical reasons, talk to your doctor before stopping it; you don't want to cause a worse problem, like a heart attack or stroke.  Diet    If certain foods seem to cause your pain, try not to eat them. Certain foods can worsen symptoms of gastritis. Limit or avoid fatty, fried, and spicy foods, as well as coffee, chocolate, mint, and foods with high  acid content such as tomatoes and citrus fruit and juices (orange, grapefruit, lemon).    Eat slowly and chew food well before swallowing. Symptoms of gastritis can be worsened by certain foods.    Don't drink alcohol. It can irritate the stomach.    Don't consume caffeine, or use tobacco. These can delay healing and worsen your problem.    Try eating smaller meals with snacks in between.    Keep an empty stomach for 2 to 3 hours before lying down.    Prop the head of the bed up if you have overnight symptoms. This helps acid clear from your esophagus.  Follow-up care  Follow up with your healthcare provider or as advised.  When to seek medical advice  Call your healthcare provider right away if any of the following occur:    Stomach pain worsens or moves to the right lower part of the abdomen    Chest pain appears, or if it worsens or spreads to the chest, back, neck, shoulder, or arm    Frequent vomiting (can t keep down liquids)    Blood in the stool or vomit (red or black color)    Feeling weak or dizzy, fainting, or having trouble breathing    Fever of 100.4 F (38 C) or higher, or as directed by your healthcare provider    Abdominal swelling  Date Last Reviewed: 3/1/2018    1250-6064 [employer]. [address] [place] [address], [place] [address]. All rights reserved. This information is not intended as a substitute for professional medical care. Always follow your healthcare professional's instructions.

## 2019-01-17 NOTE — PROGRESS NOTES
SUBJECTIVE:   Venu Cedeño is a 23 year old male who presents to clinic today for the following health issues:      ABDOMINAL   PAIN     Onset: 9 days    Description:   Character: Dull ache  Location: all over, epigastric and RUQ  Radiation: Back    Intensity: severe    Progression of Symptoms:  improving    Accompanying Signs & Symptoms:  Fever/Chills?: no   Gas/Bloating: YES  Nausea: no   Vomitting: no   Diarrhea?: no   Constipation:YES,   Dysuria or Hematuria: no    History:   Trauma: no   Previous similar pain: no    Previous tests done: none    Precipitating factors:   Does the pain change with:     Food: YES- sometimes     BM: no     Urination: no     Alleviating factors:  Rest    Therapies Tried and outcome: Anti-acids: no relieve    LMP:  not applicable      had been feeling better until yesterday.    Takes some supplement that can cause constipation. Had normal BM today.  Usually goes several days without a BM.      Only rarely drinks a little now.  Is vegan and would like check things related to that.                No Known Allergies    Patient Active Problem List   Diagnosis     Major depressive disorder, recurrent episode, mild (H)     WICHO (generalized anxiety disorder)     Psychophysiological insomnia     Cervical lymphadenopathy       Past Medical History:   Diagnosis Date     NO ACTIVE PROBLEMS          No current outpatient medications on file prior to visit.  No current facility-administered medications on file prior to visit.     Social History     Tobacco Use     Smoking status: Current Some Day Smoker     Smokeless tobacco: Never Used     Tobacco comment: 1 pack per week, e-cigarettes    Substance Use Topics     Alcohol use: Yes     Alcohol/week: 4.8 oz     Types: 8 Standard drinks or equivalent per week     Drug use: Yes     Comment: Occasional Marijuana       Family History   Problem Relation Age of Onset     Diabetes Maternal Grandmother      Coronary Artery Disease No family hx of       "Hypertension No family hx of      Hyperlipidemia No family hx of      Cerebrovascular Disease No family hx of      Breast Cancer No family hx of      Colon Cancer No family hx of      Prostate Cancer No family hx of          ROS:  General: negative for fever  GI: as above  : negative for dysuria       OBJECTIVE:  /86   Pulse 131   Temp 98.1  F (36.7  C) (Oral)   Resp 16   Ht 1.784 m (5' 10.25\")   Wt 65.3 kg (144 lb)   SpO2 98%   BMI 20.52 kg/m     General:   awake, alert, and cooperative.  NAD.   Head: Normocephalic, atraumatic.  Eyes: Conjunctiva clear, non icteric.   Heart: Regular rate and rhythm. No murmur.  Lungs: Chest is clear; no wheezes or rales.  ABD: soft, a little LUQ and epighastric tenderness to palpation , no rigidity, guarding or rebound , bowel sounds intact  Neuro: Alert and oriented - normal speech.     ASSESSMENT:well appearing    ICD-10-CM    1. Abdominal pain, epigastric R10.13 CBC with platelets differential     Comprehensive metabolic panel     Lipase     H Pylori antigen stool   2. Vegan diet Z78.9 JUST IN CASE     Vitamin B12     Folate     Vitamin D Deficiency   3. Need for vaccination Z23 TDAP VACCINE (ADACEL)           PLAN:        Advised about symptoms which might herald more serious problems.            "

## 2019-01-17 NOTE — LETTER
January 21, 2019      Venu Cedeño  [address] [place] [id number]              Dear Venu Cedeño      All of your labs were normal. We are awaiting the stool tests      Enclosed is a copy of the results.  It was a pleasure to see you at your last appointment.    If you have any questions or concerns, please call myself or my nurse at [phone].      Sincerely,      Devin Grant/ERIC Deshpande MA  [facility]      Results for orders placed or performed in visit on 01/17/19   CBC with platelets differential   Result Value Ref Range    WBC 4.2 4.0 - 11.0 10e9/L    RBC Count 5.18 4.4 - 5.9 10e12/L    Hemoglobin 15.7 13.3 - 17.7 g/dL    Hematocrit 46.1 40.0 - 53.0 %    MCV 89 78 - 100 fl    MCH 30.3 26.5 - 33.0 pg    MCHC 34.1 31.5 - 36.5 g/dL    RDW 12.8 10.0 - 15.0 %    Platelet Count 352 150 - 450 10e9/L    % Neutrophils 73.3 %    % Lymphocytes 21.0 %    % Monocytes 3.8 %    % Eosinophils 1.4 %    % Basophils 0.5 %    Absolute Neutrophil 3.1 1.6 - 8.3 10e9/L    Absolute Lymphocytes 0.9 0.8 - 5.3 10e9/L    Absolute Monocytes 0.2 0.0 - 1.3 10e9/L    Absolute Eosinophils 0.1 0.0 - 0.7 10e9/L    Absolute Basophils 0.0 0.0 - 0.2 10e9/L    Diff Method Automated Method    Comprehensive metabolic panel   Result Value Ref Range    Sodium 140 133 - 144 mmol/L    Potassium 4.1 3.4 - 5.3 mmol/L    Chloride 105 94 - 109 mmol/L    Carbon Dioxide 27 20 - 32 mmol/L    Anion Gap 8 3 - 14 mmol/L    Glucose 113 (H) 70 - 99 mg/dL    Urea Nitrogen 6 (L) 7 - 30 mg/dL    Creatinine 0.88 0.66 - 1.25 mg/dL    GFR Estimate >90 >60 mL/min/[1.73_m2]    GFR Estimate If Black >90 >60 mL/min/[1.73_m2]    Calcium 9.8 8.5 - 10.1 mg/dL    Bilirubin Total 0.4 0.2 - 1.3 mg/dL    Albumin 4.5 3.4 - 5.0 g/dL    Protein Total 7.9 6.8 - 8.8 g/dL    Alkaline Phosphatase 78 40 - 150 U/L    ALT 41 0 - 70 U/L    AST 20 0 - 45 U/L   Lipase   Result Value Ref Range    Lipase 134 73 - 393 U/L   Vitamin B12   Result Value Ref Range     Vitamin B12 696 193 - 986 pg/mL   Folate   Result Value Ref Range    Folate 23.0 >5.4 ng/mL   Vitamin D Deficiency   Result Value Ref Range    Vitamin D Deficiency screening 57 20 - 75 ug/L                 RE: Venu Cedeño   MRN: [id number]  : 1995  ENC DATE: 2019

## 2019-01-17 NOTE — NURSING NOTE
Screening Questionnaire for Adult Immunization    Are you sick today?   No   Do you have allergies to medications, food, a vaccine component or latex?   No   Have you ever had a serious reaction after receiving a vaccination?   No   Do you have a long-term health problem with heart disease, lung disease, asthma, kidney disease, metabolic disease (e.g. diabetes), anemia, or other blood disorder?   No   Do you have cancer, leukemia, HIV/AIDS, or any other immune system problem?   No   In the past 3 months, have you taken medications that affect  your immune system, such as prednisone, other steroids, or anticancer drugs; drugs for the treatment of rheumatoid arthritis, Crohn s disease, or psoriasis; or have you had radiation treatments?   No   Have you had a seizure, or a brain or other nervous system problem?   No   During the past year, have you received a transfusion of blood or blood     products, or been given immune (gamma) globulin or antiviral drug?   No   For women: Are you pregnant or is there a chance you could become        pregnant during the next month?   No   Have you received any vaccinations in the past 4 weeks?   No     Immunization questionnaire answers were all negative.        Per orders of ZULAY Grant, injection of TDaP given by Kyleigh Lombardi. Patient instructed to remain in clinic for 15 minutes afterwards, and to report any adverse reaction to me immediately.       Screening performed by Kyleigh Lombardi on 1/17/2019 at 1:44 PM.

## 2019-01-18 LAB — DEPRECATED CALCIDIOL+CALCIFEROL SERPL-MC: 57 UG/L (ref 20–75)

## 2019-01-21 NOTE — RESULT ENCOUNTER NOTE
Please send letter if doesn't check mychart.  Jc Grant PA-C    Mr. Cedeño,    All of your labs were normal. We are awaiting the stool tests    Please contact the clinic if you have additional questions or if symptoms persist.  Thank you.    Sincerely,    Devin Grant

## 2020-03-10 ENCOUNTER — HEALTH MAINTENANCE LETTER (OUTPATIENT)
Age: 25
End: 2020-03-10

## 2020-12-27 ENCOUNTER — HEALTH MAINTENANCE LETTER (OUTPATIENT)
Age: 25
End: 2020-12-27

## 2021-02-04 ENCOUNTER — NURSE TRIAGE (OUTPATIENT)
Dept: FAMILY MEDICINE | Facility: CLINIC | Age: 26
End: 2021-02-04

## 2021-02-04 ENCOUNTER — OFFICE VISIT (OUTPATIENT)
Dept: URGENT CARE | Facility: URGENT CARE | Age: 26
End: 2021-02-04
Payer: COMMERCIAL

## 2021-02-04 VITALS
BODY MASS INDEX: 27.38 KG/M2 | DIASTOLIC BLOOD PRESSURE: 103 MMHG | HEART RATE: 133 BPM | SYSTOLIC BLOOD PRESSURE: 145 MMHG | WEIGHT: 192.2 LBS | RESPIRATION RATE: 20 BRPM | OXYGEN SATURATION: 98 % | TEMPERATURE: 99.2 F

## 2021-02-04 DIAGNOSIS — N45.1 ACUTE EPIDIDYMITIS: Primary | ICD-10-CM

## 2021-02-04 DIAGNOSIS — N50.819 PAIN IN TESTICLE, UNSPECIFIED LATERALITY: ICD-10-CM

## 2021-02-04 LAB
ALBUMIN UR-MCNC: 30 MG/DL
APPEARANCE UR: CLEAR
BACTERIA #/AREA URNS HPF: ABNORMAL /HPF
BILIRUB UR QL STRIP: ABNORMAL
COLOR UR AUTO: YELLOW
GLUCOSE UR STRIP-MCNC: NEGATIVE MG/DL
HGB UR QL STRIP: NEGATIVE
HYALINE CASTS #/AREA URNS LPF: ABNORMAL /LPF
KETONES UR STRIP-MCNC: 40 MG/DL
LEUKOCYTE ESTERASE UR QL STRIP: NEGATIVE
NITRATE UR QL: NEGATIVE
NON-SQ EPI CELLS #/AREA URNS LPF: ABNORMAL /LPF
PH UR STRIP: 5 PH (ref 5–7)
RBC #/AREA URNS AUTO: ABNORMAL /HPF
SOURCE: ABNORMAL
SP GR UR STRIP: >1.03 (ref 1–1.03)
UROBILINOGEN UR STRIP-ACNC: 0.2 EU/DL (ref 0.2–1)
WBC #/AREA URNS AUTO: ABNORMAL /HPF

## 2021-02-04 PROCEDURE — 99213 OFFICE O/P EST LOW 20 MIN: CPT | Performed by: PHYSICIAN ASSISTANT

## 2021-02-04 PROCEDURE — 81001 URINALYSIS AUTO W/SCOPE: CPT | Performed by: PHYSICIAN ASSISTANT

## 2021-02-04 PROCEDURE — 87086 URINE CULTURE/COLONY COUNT: CPT | Performed by: PHYSICIAN ASSISTANT

## 2021-02-04 RX ORDER — CIPROFLOXACIN 500 MG/1
500 TABLET, FILM COATED ORAL 2 TIMES DAILY
Qty: 14 TABLET | Refills: 0 | Status: SHIPPED | OUTPATIENT
Start: 2021-02-04 | End: 2021-02-11

## 2021-02-04 ASSESSMENT — ENCOUNTER SYMPTOMS
DYSURIA: 0
MYALGIAS: 0
CHILLS: 0
SHORTNESS OF BREATH: 0
WHEEZING: 0
RESPIRATORY NEGATIVE: 1
CHEST TIGHTNESS: 0
FEVER: 0
HEADACHES: 0
SORE THROAT: 0
ABDOMINAL PAIN: 0
NEUROLOGICAL NEGATIVE: 1
HEMATURIA: 0
COUGH: 0
GASTROINTESTINAL NEGATIVE: 1
ALLERGIC/IMMUNOLOGIC NEGATIVE: 1
FREQUENCY: 0
DIARRHEA: 0
PALPITATIONS: 0
VOMITING: 0
CONSTITUTIONAL NEGATIVE: 1
CARDIOVASCULAR NEGATIVE: 1
NAUSEA: 0
MUSCULOSKELETAL NEGATIVE: 1

## 2021-02-04 NOTE — PROGRESS NOTES
Chief Complaint:    Chief Complaint   Patient presents with     Groin Swelling     right testicle been sore for the past few days-might be twisted it          Medical Decision Making:    Differential Diagnosis:  UTI: UTI, Kidney Stone, Urethritis and epididimitis      ASSESSMENT:     1. Acute epididymitis    2. Pain in testicle, unspecified laterality           PLAN:     Urine discussed with patient.  This shows many bacteria.  Will get culture today.  We will call with culture results only if positive for growth and resistant to Cipro.  Rx for Cipro today.  Patient instructed to follow up with PCP in 1 week if symptoms are not improving.  Sooner if symptoms worsen.  Worrisome symptoms discussed with instructions to go to the ED.  Patient verbalized understanding and agreed with this plan.    Labs:     Results for orders placed or performed in visit on 02/04/21   UA with Microscopic reflex to Culture     Status: Abnormal    Specimen: Midstream Urine   Result Value Ref Range    Color Urine Yellow     Appearance Urine Clear     Glucose Urine Negative NEG^Negative mg/dL    Bilirubin Urine Small (A) NEG^Negative    Ketones Urine 40 (A) NEG^Negative mg/dL    Specific Gravity Urine >1.030 1.003 - 1.035    pH Urine 5.0 5.0 - 7.0 pH    Protein Albumin Urine 30 (A) NEG^Negative mg/dL    Urobilinogen Urine 0.2 0.2 - 1.0 EU/dL    Nitrite Urine Negative NEG^Negative    Blood Urine Negative NEG^Negative    Leukocyte Esterase Urine Negative NEG^Negative    Source Midstream Urine     WBC Urine 5-10 (A) OTO5^0 - 5 /HPF    RBC Urine O - 2 OTO2^O - 2 /HPF    Hyaline Casts 25-50 (A) OTO2^O - 2 /LPF    Squamous Epithelial /LPF Urine Few FEW^Few /LPF    Bacteria Urine Many (A) NEG^Negative /HPF       Current Meds:    Current Outpatient Medications:      ciprofloxacin (CIPRO) 500 MG tablet, Take 1 tablet (500 mg) by mouth 2 times daily for 7 days, Disp: 14 tablet, Rfl: 0    Allergies:  No Known Allergies    SUBJECTIVE    HPI: Venu GARCIA  Davidson is an 25 year old male who presents for evaluation and treatment of pain and swelling in the R testicle for 2 days.  He denies any recent trauma to the area. Patient denies any urinary frequency or urgency.  No hematuria or discharge from the penis.  Patient is not concerned about STDs and declined testing today.    ROS:      Review of Systems   Constitutional: Negative.  Negative for chills and fever.   HENT: Negative.  Negative for sore throat.    Respiratory: Negative.  Negative for cough, chest tightness, shortness of breath and wheezing.    Cardiovascular: Negative.  Negative for chest pain and palpitations.   Gastrointestinal: Negative.  Negative for abdominal pain, diarrhea, nausea and vomiting.   Genitourinary: Positive for scrotal swelling and testicular pain. Negative for discharge, dysuria, frequency, hematuria, penile pain, penile swelling and urgency.   Musculoskeletal: Negative.  Negative for myalgias.   Skin: Negative for rash.   Allergic/Immunologic: Negative.  Negative for immunocompromised state.   Neurological: Negative.  Negative for headaches.        Family History   Family History   Problem Relation Age of Onset     Diabetes Maternal Grandmother      Coronary Artery Disease No family hx of      Hypertension No family hx of      Hyperlipidemia No family hx of      Cerebrovascular Disease No family hx of      Breast Cancer No family hx of      Colon Cancer No family hx of      Prostate Cancer No family hx of        Social History  Social History     Socioeconomic History     Marital status: Single     Spouse name: Not on file     Number of children: Not on file     Years of education: Not on file     Highest education level: Not on file   Occupational History     Not on file   Social Needs     Financial resource strain: Not on file     Food insecurity     Worry: Not on file     Inability: Not on file     Transportation needs     Medical: Not on file     Non-medical: Not on file   Tobacco  Use     Smoking status: Current Some Day Smoker     Smokeless tobacco: Never Used     Tobacco comment: 1 pack per week, e-cigarettes    Substance and Sexual Activity     Alcohol use: Yes     Alcohol/week: 8.0 standard drinks     Types: 8 Standard drinks or equivalent per week     Drug use: Yes     Comment: Occasional Marijuana     Sexual activity: Yes     Partners: Female   Lifestyle     Physical activity     Days per week: Not on file     Minutes per session: Not on file     Stress: Not on file   Relationships     Social connections     Talks on phone: Not on file     Gets together: Not on file     Attends Buddhist service: Not on file     Active member of club or organization: Not on file     Attends meetings of clubs or organizations: Not on file     Relationship status: Not on file     Intimate partner violence     Fear of current or ex partner: Not on file     Emotionally abused: Not on file     Physically abused: Not on file     Forced sexual activity: Not on file   Other Topics Concern     Parent/sibling w/ CABG, MI or angioplasty before 65F 55M? Not Asked   Social History Narrative     Not on file        Surgical History:  Past Surgical History:   Procedure Laterality Date     MOUTH SURGERY          Problem List:  Patient Active Problem List   Diagnosis     Major depressive disorder, recurrent episode, mild (H)     WICHO (generalized anxiety disorder)     Psychophysiological insomnia     Cervical lymphadenopathy           OBJECTIVE:     Vital signs noted and reviewed by Torres Marte PA-C  BP (!) 145/103 (BP Location: Left arm, Patient Position: Sitting, Cuff Size: Adult Large)   Pulse 133   Temp 99.2  F (37.3  C) (Tympanic)   Resp 20   Wt 87.2 kg (192 lb 3.2 oz)   SpO2 98%   BMI 27.38 kg/m       PEFR:    Physical Exam  Vitals signs and nursing note reviewed.   Constitutional:       General: He is not in acute distress.     Appearance: He is well-developed. He is not ill-appearing, toxic-appearing or  diaphoretic.   HENT:      Head: Normocephalic and atraumatic.      Right Ear: Hearing, tympanic membrane, ear canal and external ear normal. Tympanic membrane is not perforated, erythematous, retracted or bulging.      Left Ear: Hearing, tympanic membrane, ear canal and external ear normal. Tympanic membrane is not perforated, erythematous, retracted or bulging.      Nose: Nose normal. No mucosal edema, congestion or rhinorrhea.      Mouth/Throat:      Pharynx: No oropharyngeal exudate or posterior oropharyngeal erythema.      Tonsils: No tonsillar exudate or tonsillar abscesses. 0 on the right. 0 on the left.   Eyes:      Pupils: Pupils are equal, round, and reactive to light.   Neck:      Musculoskeletal: Normal range of motion and neck supple.   Cardiovascular:      Rate and Rhythm: Normal rate and regular rhythm.      Heart sounds: Normal heart sounds, S1 normal and S2 normal. Heart sounds not distant. No murmur. No friction rub. No gallop.    Pulmonary:      Effort: Pulmonary effort is normal. No respiratory distress.      Breath sounds: Normal breath sounds. No decreased breath sounds, wheezing, rhonchi or rales.   Abdominal:      General: Bowel sounds are normal. There is no distension.      Palpations: Abdomen is soft.      Tenderness: There is no abdominal tenderness.      Hernia: There is no hernia in the left inguinal area or right inguinal area.   Genitourinary:     Penis: Normal and circumcised. No phimosis, erythema, tenderness, discharge, swelling or lesions.       Testes:         Right: Tenderness present. Mass, swelling, testicular hydrocele or varicocele not present. Right testis is descended. Cremasteric reflex is present.          Left: Mass, tenderness, swelling, testicular hydrocele or varicocele not present. Left testis is descended. Cremasteric reflex is present.       Epididymis:      Right: Not inflamed or enlarged. Tenderness present. No mass.      Left: Normal. Not inflamed or enlarged.  No mass or tenderness.   Lymphadenopathy:      Cervical: No cervical adenopathy.      Lower Body: No right inguinal adenopathy. No left inguinal adenopathy.   Skin:     General: Skin is warm and dry.      Findings: No rash.   Neurological:      Mental Status: He is alert.      Cranial Nerves: No cranial nerve deficit.   Psychiatric:         Attention and Perception: He is attentive.         Speech: Speech normal.         Behavior: Behavior normal. Behavior is cooperative.         Thought Content: Thought content normal.         Judgment: Judgment normal.               Torres Marte PA-C  2/4/2021, 2:35 PM

## 2021-02-04 NOTE — PATIENT INSTRUCTIONS
Patient Education     Epididymitis   Inflammation of the epididymis can cause pain and swelling in your scrotum. The epididymis is a small tube next to the testicle that stores sperm. Epididymitis is often caused by an infection. In sexually active men, it is often caused by a sexually transmitted infection (STI) such as chlamydia or gonorrhea. In boys and in men over 40, it can be from bacteria from other parts of the urinary tract (not an STI infection).  Symptoms may begin with pain in the lower belly (abdomen) or low back. The pain then spreads down into the scrotum. Often only one side is affected. The testicle and scrotum swell and become very painful and red. You may have fever and a burning when passing urine. Sometimes you may have a discharge from the penis.  Treatment is with antibiotics, and anti-inflammatory and pain medicines. The condition should get better over the first few days of treatment. But it will take several weeks for all the swelling and mild pain to go away. If your healthcare provider thinks that an STI is the cause, your sexual partners may need to be treated.  Home care  Here are some tips to help you care for yourself at home:    Support the scrotum. When lying down, place a rolled towel under the scrotum. When walking, use an athletic supporter or 2 pairs of jockey-style underwear.    To ease pain, put ice packs on the inflamed area. To make an ice pack, put ice cubes in a plastic bag that seals at the top. Wrap the bag in a clean, thin towel. Never put an ice pack directly on the skin.    Take pain medicine as directed. You may use over-the-counter medicines to control pain, unless another medicine was given. If you have long-term (chronic) liver or kidney disease, talk with your healthcare provider before taking these medicines. Also talk with your provider if you've ever had a stomach ulcer or GI (gastrointestinal) bleeding.    Get some rest.  Rest in bed for the first few days  until the fever, pain, and swelling get better. It may take several weeks for all of the swelling to go away.    Prevent constipation. Constipation can make you strain. This makes the pain worse. Prevent constipation by eating natural laxatives. These include prunes, fresh fruits, and whole-grain cereals. If needed, use a mild over-the-counter laxative for constipation. Mineral oil can be used to keep the stools soft.    Wait to have sex. Don't have sex until you have finished all treatment and all symptoms have cleared.    Take all medicine as directed. Don't miss any doses. And don't stop taking your medicine early, even if you feel better.  Follow-up care  Follow up with your healthcare provider, or as advised, to be sure you are responding correctly to treatment. If a culture was taken, you may call for the result as directed. A culture test can ensure that you are on the correct antibiotic.   When to seek medical advice  Call your healthcare provider right away if any of these occur:    Fever of 100.4 F (38 C) or higher, or as directed by your provider    More pain or swelling of the testicle after starting treatment    Pressure or pain in your bladder that gets worse    Unable to pass urine for 8 hours  [employer] last reviewed this educational content on 9/1/2019 2000-2020 [employer]. [address] [place] [address], [place] [address]. All rights reserved. This information is not intended as a substitute for professional medical care. Always follow your healthcare professional's instructions.

## 2021-02-04 NOTE — TELEPHONE ENCOUNTER
Patient with 2-3 days of aching feeling in R testicle.   No injury or twisting known. Has not been hit there. Rates pain 2/10 currently.   Advised patient to go to [facility] urgent care now to be evaluated. Patient states he will leave for clinic now.    Talya Max BSN, RN

## 2021-02-05 ENCOUNTER — TELEPHONE (OUTPATIENT)
Dept: FAMILY MEDICINE | Facility: CLINIC | Age: 26
End: 2021-02-05

## 2021-02-05 LAB
BACTERIA SPEC CULT: NO GROWTH
Lab: NORMAL
SPECIMEN SOURCE: NORMAL

## 2021-02-05 NOTE — TELEPHONE ENCOUNTER
Reason for call: Patient calling stating he was seen at Urgent Care on 2/4/2021 and was given Cipro for treatment. Patient not comfortable with this medication and wanted to know if there is a safer medication and to send this to [facility] in [place].    Can we leave a detailed message: Yes    Patient  can be reached at: [phone]    Call taken at 4:25 pm on 2/5/2021    Andree Chambers [facility]  2nd Floor  Primary Care

## 2021-02-08 NOTE — TELEPHONE ENCOUNTER
This is a safe medication for him and he should have no problems with it.  If his symptoms have improved or resolved, then he does not need any medication at all.  Otherwise he should pick tis up and start it today.     Torres Marte PA-C    Message text

## 2021-02-08 NOTE — TELEPHONE ENCOUNTER
I LVM for patient with provider's message and if he still having questions he can give us a call back.    Silvano Bridges, CMA

## 2021-04-24 ENCOUNTER — HEALTH MAINTENANCE LETTER (OUTPATIENT)
Age: 26
End: 2021-04-24

## 2021-08-13 ENCOUNTER — OFFICE VISIT (OUTPATIENT)
Dept: URGENT CARE | Facility: URGENT CARE | Age: 26
End: 2021-08-13
Payer: COMMERCIAL

## 2021-08-13 ENCOUNTER — TELEPHONE (OUTPATIENT)
Dept: FAMILY MEDICINE | Facility: CLINIC | Age: 26
End: 2021-08-13

## 2021-08-13 VITALS
DIASTOLIC BLOOD PRESSURE: 90 MMHG | OXYGEN SATURATION: 99 % | HEART RATE: 101 BPM | SYSTOLIC BLOOD PRESSURE: 138 MMHG | TEMPERATURE: 98.6 F | WEIGHT: 184.2 LBS | BODY MASS INDEX: 26.24 KG/M2 | RESPIRATION RATE: 16 BRPM

## 2021-08-13 DIAGNOSIS — L03.011 PARONYCHIA, FINGER, RIGHT: Primary | ICD-10-CM

## 2021-08-13 PROCEDURE — 99213 OFFICE O/P EST LOW 20 MIN: CPT | Performed by: PHYSICIAN ASSISTANT

## 2021-08-13 ASSESSMENT — ENCOUNTER SYMPTOMS
DYSURIA: 0
FEVER: 0
FREQUENCY: 0
MYALGIAS: 0
WHEEZING: 0
ARTHRALGIAS: 1
CHILLS: 0
VOMITING: 0
ALLERGIC/IMMUNOLOGIC NEGATIVE: 1
NEUROLOGICAL NEGATIVE: 1
DIARRHEA: 0
PALPITATIONS: 0
SHORTNESS OF BREATH: 0
CARDIOVASCULAR NEGATIVE: 1
CHEST TIGHTNESS: 0
CONSTITUTIONAL NEGATIVE: 1
RESPIRATORY NEGATIVE: 1
COUGH: 0
ABDOMINAL PAIN: 0
NAUSEA: 0
HEMATURIA: 0
SORE THROAT: 0
GASTROINTESTINAL NEGATIVE: 1
HEADACHES: 0

## 2021-08-13 NOTE — TELEPHONE ENCOUNTER
Patient believes that he may have an Infection on his right thumb. It is red, swollen, throbbing pain for sure, the thumb pad looks swollen.  It started possibly from biting his nail. Denies: fever, streaking, nausea, vomiting.     Nursing advice:  Patient to be seen in clinic today for this issue. He will go to urgent care [place] now. He was given the urgent care hours for today.  Patient verbalizes good understanding, agrees with plan and states he needs no further support. Mirtha Bob R.N.

## 2021-08-13 NOTE — PROGRESS NOTES
Chief Complaint:    Chief Complaint   Patient presents with     Finger     Right thumb looks infected x 2 days         Medical Decision Making:    Vital signs reviewed by Torres Marte PA-C  BP (!) 138/90 (BP Location: Left arm, Patient Position: Sitting, Cuff Size: Adult Regular)   Pulse 101   Temp 98.6  F (37  C) (Tympanic)   Resp 16   Wt 83.6 kg (184 lb 3.2 oz)   SpO2 99%   BMI 26.24 kg/m      Differential Diagnosis:  Paronychia, gout, cellulitis     ASSESSMENT:     1. Paronychia, finger, right           PLAN:     Rx for Augmentin today.  Start finger soaks 2-3 times per day for the next week.  Patient instructed to follow up with PCP in 1 week if symptoms are not improving.  Sooner if symptoms worsen.  Worrisome symptoms discussed with instructions to go to the ED.  Patient verbalized understanding and agreed with this plan.    Labs:     No results found for any visits on 08/13/21.    Current Meds:    Current Outpatient Medications:      amoxicillin-clavulanate (AUGMENTIN) 875-125 MG tablet, Take 1 tablet by mouth 2 times daily for 7 days, Disp: 14 tablet, Rfl: 0    Allergies:  No Known Allergies    SUBJECTIVE    HPI: Venu Cedeño is an 26 year old male who presents for evaluation and treatment of R thumb pain and swelling.  Symptoms started 2 days ago after he bit off a hang nail and have worsening.  Tj numbness, tingling or dysfunction.    ROS:      Review of Systems   Constitutional: Negative.  Negative for chills and fever.   HENT: Negative.  Negative for sore throat.    Respiratory: Negative.  Negative for cough, chest tightness, shortness of breath and wheezing.    Cardiovascular: Negative.  Negative for chest pain and palpitations.   Gastrointestinal: Negative.  Negative for abdominal pain, diarrhea, nausea and vomiting.   Genitourinary: Negative for dysuria, frequency, hematuria and urgency.   Musculoskeletal: Positive for arthralgias. Negative for myalgias.   Skin: Negative for rash.    Allergic/Immunologic: Negative.  Negative for immunocompromised state.   Neurological: Negative.  Negative for headaches.        Family History   Family History   Problem Relation Age of Onset     Diabetes Maternal Grandmother      Coronary Artery Disease No family hx of      Hypertension No family hx of      Hyperlipidemia No family hx of      Cerebrovascular Disease No family hx of      Breast Cancer No family hx of      Colon Cancer No family hx of      Prostate Cancer No family hx of        Social History  Social History     Socioeconomic History     Marital status: Single     Spouse name: Not on file     Number of children: Not on file     Years of education: Not on file     Highest education level: Not on file   Occupational History     Not on file   Tobacco Use     Smoking status: Current Some Day Smoker     Smokeless tobacco: Never Used     Tobacco comment: 1 pack per week, e-cigarettes    Substance and Sexual Activity     Alcohol use: Yes     Alcohol/week: 8.0 standard drinks     Types: 8 Standard drinks or equivalent per week     Drug use: Yes     Comment: Occasional Marijuana     Sexual activity: Yes     Partners: Female   Other Topics Concern     Parent/sibling w/ CABG, MI or angioplasty before 65F 55M? Not Asked   Social History Narrative     Not on file     Social Determinants of Health     Financial Resource Strain:      Difficulty of Paying Living Expenses:    Food Insecurity:      Worried About Running Out of Food in the Last Year:      Ran Out of Food in the Last Year:    Transportation Needs:      Lack of Transportation (Medical):      Lack of Transportation (Non-Medical):    Physical Activity:      Days of Exercise per Week:      Minutes of Exercise per Session:    Stress:      Feeling of Stress :    Social Connections:      Frequency of Communication with Friends and Family:      Frequency of Social Gatherings with Friends and Family:      Attends Zoroastrianism Services:      Active Member of  Clubs or Organizations:      Attends Club or Organization Meetings:      Marital Status:    Intimate Partner Violence:      Fear of Current or Ex-Partner:      Emotionally Abused:      Physically Abused:      Sexually Abused:         Surgical History:  Past Surgical History:   Procedure Laterality Date     MOUTH SURGERY          Problem List:  Patient Active Problem List   Diagnosis     Major depressive disorder, recurrent episode, mild (H)     WICHO (generalized anxiety disorder)     Psychophysiological insomnia     Cervical lymphadenopathy           OBJECTIVE:     Vital signs noted and reviewed by Torres Marte PA-C  BP (!) 138/90 (BP Location: Left arm, Patient Position: Sitting, Cuff Size: Adult Regular)   Pulse 101   Temp 98.6  F (37  C) (Tympanic)   Resp 16   Wt 83.6 kg (184 lb 3.2 oz)   SpO2 99%   BMI 26.24 kg/m       PEFR:    Physical Exam  Vitals and nursing note reviewed.   Constitutional:       General: He is not in acute distress.     Appearance: He is well-developed. He is not ill-appearing, toxic-appearing or diaphoretic.   HENT:      Head: Normocephalic and atraumatic.      Right Ear: Hearing, tympanic membrane, ear canal and external ear normal. Tympanic membrane is not perforated, erythematous, retracted or bulging.      Left Ear: Hearing, tympanic membrane, ear canal and external ear normal. Tympanic membrane is not perforated, erythematous, retracted or bulging.      Nose: Nose normal. No mucosal edema, congestion or rhinorrhea.      Mouth/Throat:      Pharynx: No oropharyngeal exudate or posterior oropharyngeal erythema.      Tonsils: No tonsillar exudate or tonsillar abscesses. 0 on the right. 0 on the left.   Eyes:      Pupils: Pupils are equal, round, and reactive to light.   Cardiovascular:      Rate and Rhythm: Normal rate and regular rhythm.      Heart sounds: Normal heart sounds, S1 normal and S2 normal. Heart sounds not distant. No murmur heard.   No friction rub. No gallop.     Pulmonary:      Effort: Pulmonary effort is normal. No respiratory distress.      Breath sounds: Normal breath sounds. No decreased breath sounds, wheezing, rhonchi or rales.   Abdominal:      General: Bowel sounds are normal. There is no distension.      Palpations: Abdomen is soft.      Tenderness: There is no abdominal tenderness.   Musculoskeletal:      Right hand: Swelling and tenderness present. No deformity or bony tenderness. Normal range of motion. Normal strength. Normal sensation. There is no disruption of two-point discrimination. Normal capillary refill.        Hands:       Cervical back: Normal range of motion and neck supple.   Lymphadenopathy:      Cervical: No cervical adenopathy.   Skin:     General: Skin is warm and dry.      Findings: No rash.   Neurological:      Mental Status: He is alert.      Cranial Nerves: No cranial nerve deficit.   Psychiatric:         Attention and Perception: He is attentive.         Speech: Speech normal.         Behavior: Behavior normal. Behavior is cooperative.         Thought Content: Thought content normal.         Judgment: Judgment normal.             Torres Marte PA-C  8/13/2021, 3:20 PM

## 2021-08-13 NOTE — PATIENT INSTRUCTIONS
Patient Education     Paronychia of the Finger or Toe  Paronychia is an infection near a fingernail or toenail. It usually occurs when an opening in the cuticle or an ingrown toenail lets bacteria under the skin.   The infection will need to be drained if pus is present. If the infection has been caught early, you may need only antibiotic treatment. Healing will take about 1 to 2 weeks.   Home care  Follow these guidelines when caring for yourself at home:     Clean and soak the toe or finger. Do this 2 times a day for the first 3 days. To do so:  ? Soak your foot or hand in a tub of warm water for 5 minutes. Or hold your toe or finger under a faucet of warm running water for 5 minutes.  ? Clean any crust away with soap and water using a cotton swab.  ? Put antibiotic ointment on the infected area.    Change the dressing daily or any time it gets dirty.    If you were given antibiotics, take them as directed until they are all gone.    If your infection is on a toe, wear comfortable shoes with a lot of toe room. You can also wear open-toed sandals while your toe heals.    You may use over-the-counter medicine (acetaminophen or ibuprofen) to help with pain, unless another medicine was prescribed. If you have chronic liver or kidney disease, talk with your healthcare provider before using these medicines. Also talk with your provider if you've had a stomach ulcer or gastrointestinal bleeding.  Prevention  The following can prevent paronychia:     Don't cut or play with your cuticles at home. A healthy cuticle maintains a seal between your skin and nail and keeps out infection.    Don't bite your nails.    Don't suck on your thumbs or fingers.  Follow-up care  Follow up with your healthcare provider, or as advised.   When to seek medical advice  Call your healthcare provider right away if any of these occur:     Redness, pain, or swelling of the finger or toe gets worse    You have trouble moving or bending the  finger or toe    Red streaks in the skin leading away from the wound    Pus or fluid draining from the nail area    Fever of 100.4 F (38 C) or higher, or as directed by your provider  [employer] last reviewed this educational content on 7/1/2019 2000-2021 [employer]. All rights reserved. This information is not intended as a substitute for professional medical care. Always follow your healthcare professional's instructions.

## 2021-10-09 ENCOUNTER — HEALTH MAINTENANCE LETTER (OUTPATIENT)
Age: 26
End: 2021-10-09

## 2022-05-16 ENCOUNTER — HEALTH MAINTENANCE LETTER (OUTPATIENT)
Age: 27
End: 2022-05-16

## 2022-09-11 ENCOUNTER — HEALTH MAINTENANCE LETTER (OUTPATIENT)
Age: 27
End: 2022-09-11

## 2023-06-03 ENCOUNTER — HEALTH MAINTENANCE LETTER (OUTPATIENT)
Age: 28
End: 2023-06-03

## 2024-05-23 ENCOUNTER — OFFICE VISIT (OUTPATIENT)
Dept: FAMILY MEDICINE | Facility: CLINIC | Age: 29
End: 2024-05-23
Payer: COMMERCIAL

## 2024-05-23 VITALS
OXYGEN SATURATION: 100 % | HEIGHT: 70 IN | BODY MASS INDEX: 23.42 KG/M2 | TEMPERATURE: 97.4 F | HEART RATE: 103 BPM | RESPIRATION RATE: 18 BRPM | SYSTOLIC BLOOD PRESSURE: 139 MMHG | WEIGHT: 163.6 LBS | DIASTOLIC BLOOD PRESSURE: 87 MMHG

## 2024-05-23 DIAGNOSIS — Z72.0 NICOTINE ABUSE: ICD-10-CM

## 2024-05-23 DIAGNOSIS — Z00.00 ROUTINE GENERAL MEDICAL EXAMINATION AT A HEALTH CARE FACILITY: Primary | ICD-10-CM

## 2024-05-23 DIAGNOSIS — Z83.3 FAMILY HISTORY OF DIABETES MELLITUS: ICD-10-CM

## 2024-05-23 DIAGNOSIS — Z78.9 VEGAN DIET: ICD-10-CM

## 2024-05-23 DIAGNOSIS — Z11.3 ROUTINE SCREENING FOR STI (SEXUALLY TRANSMITTED INFECTION): ICD-10-CM

## 2024-05-23 LAB
ERYTHROCYTE [DISTWIDTH] IN BLOOD BY AUTOMATED COUNT: 12.5 % (ref 10–15)
HBA1C MFR BLD: 5.1 % (ref 0–5.6)
HCT VFR BLD AUTO: 47.4 % (ref 40–53)
HGB BLD-MCNC: 15.6 G/DL (ref 13.3–17.7)
MCH RBC QN AUTO: 29.2 PG (ref 26.5–33)
MCHC RBC AUTO-ENTMCNC: 32.9 G/DL (ref 31.5–36.5)
MCV RBC AUTO: 89 FL (ref 78–100)
PLATELET # BLD AUTO: 342 10E3/UL (ref 150–450)
RBC # BLD AUTO: 5.34 10E6/UL (ref 4.4–5.9)
WBC # BLD AUTO: 8.3 10E3/UL (ref 4–11)

## 2024-05-23 PROCEDURE — 85027 COMPLETE CBC AUTOMATED: CPT

## 2024-05-23 PROCEDURE — 87340 HEPATITIS B SURFACE AG IA: CPT

## 2024-05-23 PROCEDURE — 90677 PCV20 VACCINE IM: CPT

## 2024-05-23 PROCEDURE — 90471 IMMUNIZATION ADMIN: CPT

## 2024-05-23 PROCEDURE — 83036 HEMOGLOBIN GLYCOSYLATED A1C: CPT

## 2024-05-23 PROCEDURE — 87389 HIV-1 AG W/HIV-1&-2 AB AG IA: CPT

## 2024-05-23 PROCEDURE — 36415 COLL VENOUS BLD VENIPUNCTURE: CPT

## 2024-05-23 PROCEDURE — 86780 TREPONEMA PALLIDUM: CPT

## 2024-05-23 PROCEDURE — 86803 HEPATITIS C AB TEST: CPT

## 2024-05-23 PROCEDURE — 87591 N.GONORRHOEAE DNA AMP PROB: CPT

## 2024-05-23 PROCEDURE — 87491 CHLMYD TRACH DNA AMP PROBE: CPT

## 2024-05-23 PROCEDURE — 99395 PREV VISIT EST AGE 18-39: CPT | Mod: 25

## 2024-05-23 SDOH — HEALTH STABILITY: PHYSICAL HEALTH: ON AVERAGE, HOW MANY DAYS PER WEEK DO YOU ENGAGE IN MODERATE TO STRENUOUS EXERCISE (LIKE A BRISK WALK)?: 0 DAYS

## 2024-05-23 ASSESSMENT — PATIENT HEALTH QUESTIONNAIRE - PHQ9
10. IF YOU CHECKED OFF ANY PROBLEMS, HOW DIFFICULT HAVE THESE PROBLEMS MADE IT FOR YOU TO DO YOUR WORK, TAKE CARE OF THINGS AT HOME, OR GET ALONG WITH OTHER PEOPLE: NOT DIFFICULT AT ALL
SUM OF ALL RESPONSES TO PHQ QUESTIONS 1-9: 2
SUM OF ALL RESPONSES TO PHQ QUESTIONS 1-9: 2

## 2024-05-23 ASSESSMENT — PAIN SCALES - GENERAL: PAINLEVEL: NO PAIN (0)

## 2024-05-23 ASSESSMENT — SOCIAL DETERMINANTS OF HEALTH (SDOH): HOW OFTEN DO YOU GET TOGETHER WITH FRIENDS OR RELATIVES?: MORE THAN THREE TIMES A WEEK

## 2024-05-23 NOTE — PROGRESS NOTES
Preventive Care Visit  [facility]  HARRIS Sandoval CNP, Family Medicine  May 23, 2024      Assessment & Plan     Routine general medical examination at a health care facility  - Health maintenance and lifestyle reviewed. Updated medical and family history.  - Follow up in one year or sooner as needed.   - REVIEW OF HEALTH MAINTENANCE PROTOCOL ORDERS  - PRIMARY CARE FOLLOW-UP SCHEDULING    Family history of diabetes mellitus  - Brother recently diagnosed. Asymptomatic.   - Hemoglobin A1c    Vegan diet  - Continue Vitamin B12 supplement.  - Discussed dietary sources of iron, protein, and B vitamins.   - CBC with platelets    Nicotine abuse  - Vapes nicotine daily. Not interested in cessation at this time.   - PNEUMOCOCCAL 20 VALENT CONJUGATE (PREVNAR 20)    Routine screening for STI (sexually transmitted infection)  - New partner since previous testing.   - Chlamydia & Gonorrhea by PCR, [facility] Collect  - Treponema Abs w Reflex to [facility] and Titer  - HIV Antigen Antibody Combo  - Hepatitis C antibody  - Hepatitis B surface antigen    Nicotine/Tobacco Cessation  He reports that he has been smoking. He has never used smokeless tobacco.  Nicotine/Tobacco Cessation Plan  Information offered: Patient not interested at this time    Counseling  Appropriate preventive services were discussed with this patient, including applicable screening as appropriate for fall prevention, nutrition, physical activity, Tobacco-use cessation, weight loss and cognition.  Checklist reviewing preventive services available has been given to the patient.  Reviewed patient's diet, addressing concerns and/or questions.   The patient was instructed to see the dentist every 6 months.   He is at risk for psychosocial distress and has been provided with information to reduce risk.     Return for physical in one year or sooner as needed.    Bren Lea is a 29 year old, presenting for the  following:  Physical        5/23/2024    11:16 AM   Additional Questions   Roomed by zelalem         5/23/2024    11:16 AM   Patient Reported Additional Medications   Patient reports taking the following new medications none        Health Care Directive  Patient does not have a Health Care Directive or Living Will: Discussed advance care planning with patient; information given to patient to review.    HPI     Brother just got diagnosed with diabetes at 31. Takes insulin.   Vegan. Endorses poor diet, infrequent exercise.   Denies concerns with depression, anxiety, sleep.         5/23/2024   General Health   How would you rate your overall physical health? (!) FAIR   Feel stress (tense, anxious, or unable to sleep) Only a little   (!) STRESS CONCERN      5/23/2024   Nutrition   Three or more servings of calcium each day? (!) I DON'T KNOW   Diet: Vegetarian/vegan   How many servings of fruit and vegetables per day? (!) 0-1   How many sweetened beverages each day? 0-1         5/23/2024   Exercise   Days per week of moderate/strenous exercise 0 days   (!) EXERCISE CONCERN        5/23/2024   Social Factors   Frequency of gathering with friends or relatives More than three times a week   Worry food won't last until get money to buy more Yes   Food not last or not have enough money for food? Yes   Do you have housing?  Yes   Are you worried about losing your housing? No   Lack of transportation? No   Unable to get utilities (heat,electricity)? No   (!) FOOD SECURITY CONCERN PRESENT        5/23/2024   Dental   Dentist two times every year? (!) NO         5/23/2024   TB Screening   Were you born outside of the [place]? No     Today's PHQ-9 Score:       5/23/2024    11:12 AM   PHQ-9 SCORE   PHQ-9 Total Score MyChart 2 (Minimal depression)   PHQ-9 Total Score 2         5/23/2024   Substance Use   Alcohol more than 3/day or more than 7/wk No   Do you use any other substances recreationally? (!) KRATOM     Social History     Tobacco Use  "   Smoking status: Some Days    Smokeless tobacco: Never    Tobacco comments:     1 pack per week, e-cigarettes    Vaping Use    Vaping status: Every Day    Substances: Nicotine   Substance Use Topics    Alcohol use: Not Currently    Drug use: Yes     Comment: Drinks Kratom powder daily         5/23/2024   STI Screening   New sexual partner(s) since last STI/HIV test? (!) YES          5/23/2024   Contraception/Family Planning   Questions about contraception or family planning No     Reviewed and updated as needed this visit by Provider   Tobacco   Meds  Problems   Surg Hx  Fam Hx  Soc Hx Sexual Activity         Review of Systems  Constitutional, HEENT, cardiovascular, pulmonary, gi and gu systems are negative, except as otherwise noted.     Objective    Exam  /87 (BP Location: Left arm, Patient Position: Sitting, Cuff Size: Adult Regular)   Pulse 103   Temp 97.4  F (36.3  C) (Tympanic)   Resp 18   Ht 1.778 m (5' 10\")   Wt 74.2 kg (163 lb 9.6 oz)   SpO2 100%   BMI 23.47 kg/m     Estimated body mass index is 23.47 kg/m  as calculated from the following:    Height as of this encounter: 1.778 m (5' 10\").    Weight as of this encounter: 74.2 kg (163 lb 9.6 oz).    Physical Exam  GENERAL: alert and no distress  EYES: Eyes grossly normal to inspection, PERRL and conjunctivae and sclerae normal  HENT: ear canals and TM's normal, nose and mouth without ulcers or lesions  NECK: no adenopathy, no asymmetry, masses, or scars  RESP: lungs clear to auscultation - no rales, rhonchi or wheezes  CV: regular rate and rhythm, normal S1 S2, no S3 or S4, no murmur, click or rub, no peripheral edema  ABDOMEN: soft, nontender, no hepatosplenomegaly, no masses and bowel sounds normal  MS: no gross musculoskeletal defects noted, no edema  SKIN: no suspicious lesions or rashes  NEURO: Normal strength and tone, mentation intact and speech normal  PSYCH: mentation appears normal, affect normal/bright    Signed Electronically " by: HARRIS Sandoval CNP

## 2024-05-23 NOTE — NURSING NOTE
Prior to immunization administration, verified patients identity using patient s name and date of birth. Please see Immunization Activity for additional information.     Screening Questionnaire for Adult Immunization    Are you sick today?   No   Do you have allergies to medications, food, a vaccine component or latex?   No   Have you ever had a serious reaction after receiving a vaccination?   No   Do you have a long-term health problem with heart, lung, kidney, or metabolic disease (e.g., diabetes), asthma, a blood disorder, no spleen, complement component deficiency, a cochlear implant, or a spinal fluid leak?  Are you on long-term aspirin therapy?   No   Do you have cancer, leukemia, HIV/AIDS, or any other immune system problem?   No   Do you have a parent, brother, or sister with an immune system problem?   No   In the past 3 months, have you taken medications that affect  your immune system, such as prednisone, other steroids, or anticancer drugs; drugs for the treatment of rheumatoid arthritis, Crohn s disease, or psoriasis; or have you had radiation treatments?   No   Have you had a seizure, or a brain or other nervous system problem?   No   During the past year, have you received a transfusion of blood or blood    products, or been given immune (gamma) globulin or antiviral drug?   No   For women: Are you pregnant or is there a chance you could become       pregnant during the next month?   No   Have you received any vaccinations in the past 4 weeks?   No     Immunization questionnaire answers were all negative.      Patient instructed to remain in clinic for 15 minutes afterwards, and to report any adverse reactions.     Screening performed by Connie Benitez MA on 5/23/2024 at 11:54 AM.

## 2024-05-23 NOTE — COMMUNITY RESOURCES LIST (ENGLISH)
May 23, 2024           YOUR PERSONALIZED LIST OF SERVICES & PROGRAMS           NAVIGATION    Eligibility Screening      [facility] application assistance  [address] [place] [address] (Distance: 3.8 miles)  Language: English  Fee: Free      [facility]  Service Officers ([employer])  [address] [place] [address] (Distance: 3.8 miles)  Language: English  Fee: Free  Accessibility: [place] accessible, Blind accommodation, Deaf or hard of hearing      [employer] [place] - SNAP (formerly food stamps) Screening and Application help  Phone: (299) 411-5962  Website: https://www.[facility].org/programs/[place]-food-helpline/  Language: English  Hours: Mon 10:00 AM - 5:00 PM Tue 10:00 AM - 5:00 PM Wed 10:00 AM - 5:00 PM Thu 10:00 AM - 5:00 PM Fri 10:00 AM - 5:00 PM  Fee: Free  Accessibility: [place] accessible, Blind accommodation, Deaf or hard of hearing, Translation services        ASSISTANCE    Nutrition Benefits      [facility] application assistance  [address] [place] [address] (Distance: 3.8 miles)  Language: English  Fee: Free      [facility] application assistance  [address] [place] [address] (Distance: 4.7 miles)  Phone: (530) 549-4631  Language: English  Fee: Free  Accessibility: [place] accessible      [employer] [place] - SNAP (formerly food stamps) Screening and Application help  Phone: (341) 866-2832  Website: https://www.[facility].org/programs/[place]-food-helpline/  Language: English  Hours: Mon 10:00 AM - 5:00 PM Tue 10:00 AM - 5:00 PM Wed 10:00 AM - 5:00 PM Thu 10:00 AM - 5:00 PM Fri 10:00 AM - 5:00 PM  Fee: Free  Accessibility: [place] accessible, Blind accommodation, Deaf or hard of hearing, Translation services    Pantry      Food Shelf ([facility]) - [employer] Delivery Program  [address] [place] [address] (Distance: 3.6 miles)  Phone: (350) 802-7872  Website: https://www.grabHalof.org  Language: English   Fee: Free      [facility] - Food pantry  [address] [place] [address] (Distance: 4.7 miles)  Phone: (274) 606-2301  Website: http://www.Onlineprinters.[employer]/  Language: English  Fee: Free  Accessibility: [place] accessible      [employer] - [place] [employer]  Phone: (322) 957-1172  Website: www.IndaBox.[employer]  Language: English, Thai  Hours: Mon 9:00 AM - 3:30 PM Tue 9:00 AM - 6:30 PM Wed 9:00 AM - 3:30 PM Thu 9:00 AM - 12:30 PM Fri 9:00 AM - 12:30 PM Sat 9:00 AM - 12:00 PM  Fee: Free               IMPORTANT NUMBERS & WEBSITES        Emergency Services  911  .   [facility]  211 http://211itedway.org  .   Poison Control  (578) 819-4639 http://[place]poison.org http://wis[place]poison.org  .     Suicide and Crisis Lifeline  988 http://988lifeline.org  .   Childhelp Home Child Abuse Hotline  572.602.3358 http://Childhelphotline.org   .   National Sexual Assault Hotline  (155) 861-2403 (HOPE) http://StudyEgg.org   .     Home Runaway Safeline  (324) 640-1313 (RUNAWAY) http://SunModular.[employer]  .   Pregnancy & Postpartum Support  Call/text 584-277-5794  [place]: http://ppsupportmn.org  [place]: http://[employer].com/wi  .   Substance Abuse National Helpline ([facility])  964-117-HELP (3496) http://Findtreatment.gov   .                DISCLAIMER: These resources have been generated via the [employer] Platform. [employer] does not endorse any service providers mentioned in this resource list. [employer] does not guarantee that the services mentioned in this resource list will be available to you or will improve your health or wellness.    [facility]

## 2024-05-23 NOTE — PATIENT INSTRUCTIONS
"Preventive Care Advice   This is general advice we often give to help people stay healthy. Your care team may have specific advice just for you. Please talk to your care team about your own preventive care needs.  Lifestyle  Exercise at least 150 minutes each week (30 minutes a day, 5 days a week).  Do muscle strengthening activities 2 days a week. These help control your weight and prevent disease.  No smoking.  Wear sunscreen to prevent skin cancer.  Have your home tested for radon every 2 to 5 years. Radon is a colorless, odorless gas that can harm your lungs. To learn more, go to www.health.[facility].mn. and search for \"Radon in Homes.\"  Keep guns unloaded and locked up in a safe place like a safe or gun vault, or, use a gun lock and hide the keys. Always lock away bullets separately. To learn more, visit [employer].[place].gov and search for \"safe gun storage.\"  Nutrition  Eat 5 or more servings of fruits and vegetables each day.  Try wheat bread, brown rice and whole grain pasta (instead of white bread, rice, and pasta).  Get enough calcium and vitamin D. Check the label on foods and aim for 100% of the RDA (recommended daily allowance).  Regular exams  Have a dental exam and cleaning every 6 months.  See your health care team every year to talk about:  Any changes in your health.  Any medicines your care team has prescribed.  Preventive care, family planning, and ways to prevent chronic diseases.  Shots (vaccines)   HPV shots (up to age 26), if you've never had them before.  Hepatitis B shots (up to age 59), if you've never had them before.  COVID-19 shot: Get this shot when it's due.  Flu shot: Get a flu shot every year.  Tetanus shot: Get a tetanus shot every 10 years.  Pneumococcal, hepatitis A, and RSV shots: Ask your care team if you need these based on your risk.  Shingles shot (for age 50 and up).  General health tests  Diabetes screening:  Starting at age 35, Get screened for diabetes at least every 3 years.  If " you are younger than age 35, ask your care team if you should be screened for diabetes.  Cholesterol test: At age 39, start having a cholesterol test every 5 years, or more often if advised.  Bone density scan (DEXA): At age 50, ask your care team if you should have this scan for osteoporosis (brittle bones).  Hepatitis C: Get tested at least once in your life.  Abdominal aortic aneurysm screening: Talk to your doctor about having this screening if you:  Have ever smoked; and  Are biologically male; and  Are between the ages of 65 and 75.  STIs (sexually transmitted infections)  Before age 24: Ask your care team if you should be screened for STIs.  After age 24: Get screened for STIs if you're at risk. You are at risk for STIs (including HIV) if:  You are sexually active with more than one person.  You don't use condoms every time.  You or a partner was diagnosed with a sexually transmitted infection.  If you are at risk for HIV, ask about PrEP medicine to prevent HIV.  Get tested for HIV at least once in your life, whether you are at risk for HIV or not.  Cancer screening tests  Cervical cancer screening: If you have a cervix, begin getting regular cervical cancer screening tests at age 21. Most people who have regular screenings with normal results can stop after age 65. Talk about this with your provider.  Breast cancer scan (mammogram): If you've ever had breasts, begin having regular mammograms starting at age 40. This is a scan to check for breast cancer.  Colon cancer screening: It is important to start screening for colon cancer at age 45.  Have a colonoscopy test every 10 years (or more often if you're at risk) Or, ask your provider about stool tests like a FIT test every year or Cologuard test every 3 years.  To learn more about your testing options, visit: [email or web address]/[email or web address].  For help making a decision, visit: [email or web address].  Prostate cancer screening test: If you have a prostate and are age 55  to 69, ask your provider if you would benefit from a yearly prostate cancer screening test.  Lung cancer screening: If you are a current or former smoker age 50 to 80, ask your care team if ongoing lung cancer screenings are right for you.  For informational purposes only. Not to replace the advice of your health care provider. Copyright   2023 [employer]. All rights reserved. Clinically reviewed by the [facility]. [employer] [id number] - REV 04/24.    Learning About Stress  What is stress?     Stress is your body's response to a hard situation. Your body can have a physical, emotional, or mental response. Stress is a fact of life for most people, and it affects everyone differently. What causes stress for you may not be stressful for someone else.  A lot of things can cause stress. You may feel stress when you go on a job interview, take a test, or run a race. This kind of short-term stress is normal and even useful. It can help you if you need to work hard or react quickly. For example, stress can help you finish an important job on time.  Long-term stress is caused by ongoing stressful situations or events. Examples of long-term stress include long-term health problems, ongoing problems at work, or conflicts in your family. Long-term stress can harm your health.  How does stress affect your health?  When you are stressed, your body responds as though you are in danger. It makes hormones that speed up your heart, make you breathe faster, and give you a burst of energy. This is called the fight-or-flight stress response. If the stress is over quickly, your body goes back to normal and no harm is done.  But if stress happens too often or lasts too long, it can have bad effects. Long-term stress can make you more likely to get sick, and it can make symptoms of some diseases worse. If you tense up when you are stressed, you may develop neck, shoulder, or low back pain. Stress is  linked to high blood pressure and heart disease.  Stress also harms your emotional health. It can make you boo, tense, or depressed. Your relationships may suffer, and you may not do well at work or school.  What can you do to manage stress?  You can try these things to help manage stress:   Do something active. Exercise or activity can help reduce stress. Walking is a great way to get started. Even everyday activities such as housecleaning or yard work can help.  Try yoga or jose chi. These techniques combine exercise and meditation. You may need some training at first to learn them.  Do something you enjoy. For example, listen to music or go to a movie. Practice your hobby or do volunteer work.  Meditate. This can help you relax, because you are not worrying about what happened before or what may happen in the future.  Do guided imagery. Imagine yourself in any setting that helps you feel calm. You can use online videos, books, or a teacher to guide you.  Do breathing exercises. For example:  From a standing position, bend forward from the waist with your knees slightly bent. Let your arms dangle close to the floor.  Breathe in slowly and deeply as you return to a standing position. Roll up slowly and lift your head last.  Hold your breath for just a few seconds in the standing position.  Breathe out slowly and bend forward from the waist.  Let your feelings out. Talk, laugh, cry, and express anger when you need to. Talking with supportive friends or family, a counselor, or a quentin leader about your feelings is a healthy way to relieve stress. Avoid discussing your feelings with people who make you feel worse.  Write. It may help to write about things that are bothering you. This helps you find out how much stress you feel and what is causing it. When you know this, you can find better ways to cope.  What can you do to prevent stress?  You might try some of these things to help prevent stress:  Manage your time.  "This helps you find time to do the things you want and need to do.  Get enough sleep. Your body recovers from the stresses of the day while you are sleeping.  Get support. Your family, friends, and community can make a difference in how you experience stress.  Limit your news feed. Avoid or limit time on social media or news that may make you feel stressed.  Do something active. Exercise or activity can help reduce stress. Walking is a great way to get started.  Where can you learn more?  Go to https://www.[employer].net/patiented  Enter N032 in the search box to learn more about \"Learning About Stress.\"  Current as of: October 24, 2023               Content Version: 14.0    0694-5967 [employer].   Care instructions adapted under license by your healthcare professional. If you have questions about a medical condition or this instruction, always ask your healthcare professional. [employer] disclaims any warranty or liability for your use of this information.      Substance Use Disorder: Care Instructions  Overview     You can improve your life and health by stopping your use of alcohol or drugs. When you don't drink or use drugs, you may feel and sleep better. You may get along better with your family, friends, and coworkers. There are medicines and programs that can help with substance use disorder.  How can you care for yourself at home?  Here are some ways to help you stay sober and prevent relapse.  If you have been given medicine to help keep you sober or reduce your cravings, be sure to take it exactly as prescribed.  Talk to your doctor about programs that can help you stop using drugs or drinking alcohol.  Do not keep alcohol or drugs in your home.  Plan ahead. Think about what you'll say if other people ask you to drink or use drugs. Try not to spend time with people who drink or use drugs.  Use the time and money spent on drinking or drugs to do something that's important to " you.  Preventing a relapse  Have a plan to deal with relapse. Learn to recognize changes in your thinking that lead you to drink or use drugs. Get help before you start to drink or use drugs again.  Try to stay away from situations, friends, or places that may lead you to drink or use drugs.  If you feel the need to drink alcohol or use drugs again, seek help right away. Call a trusted friend or family member. Some people get support from organizations such as Narcotics Anonymous or [employer] or from treatment facilities.  If you relapse, get help as soon as you can. Some people make a plan with another person that outlines what they want that person to do for them if they relapse. The plan usually includes how to handle the relapse and who to notify in case of relapse.  Don't give up. Remember that a relapse doesn't mean that you have failed. Use the experience to learn the triggers that lead you to drink or use drugs. Then quit again. Recovery is a lifelong process. Many people have several relapses before they are able to quit for good.  Follow-up care is a key part of your treatment and safety. Be sure to make and go to all appointments, and call your doctor if you are having problems. It's also a good idea to know your test results and keep a list of the medicines you take.  When should you call for help?   Call 911  anytime you think you may need emergency care. For example, call if you or someone else:    Has overdosed or has withdrawal signs. Be sure to tell the emergency workers that you are or someone else is using or trying to quit using drugs. Overdose or withdrawal signs may include:  Losing consciousness.  Seizure.  Seeing or hearing things that aren't there (hallucinations).     Is thinking or talking about suicide or harming others.   Where to get help 24 hours a day, 7 days a week   If you or someone you know talks about suicide, self-harm, a mental health crisis, a substance use crisis, or any  "other kind of emotional distress, get help right away. You can:    Call the Suicide and Crisis Lifeline at 988.     Call 7-951-332-TALK (1-323.454.8419).     Text HOME to 537939 to access the Crisis Text Line.   Consider saving these numbers in your phone.  Go to [employer] for more information or to chat online.  Call your doctor now or seek immediate medical care if:    You are having withdrawal symptoms. These may include nausea or vomiting, sweating, shakiness, and anxiety.   Watch closely for changes in your health, and be sure to contact your doctor if:    You have a relapse.     You need more help or support to stop.   Where can you learn more?  Go to https://www.[employer].net/patiented  Enter H573 in the search box to learn more about \"Substance Use Disorder: Care Instructions.\"  Current as of: November 15, 2023               Content Version: 14.0    5819-7521 [employer].   Care instructions adapted under license by your healthcare professional. If you have questions about a medical condition or this instruction, always ask your healthcare professional. [employer] disclaims any warranty or liability for your use of this information.      Safer Sex: Care Instructions  Overview  Safer sex is a way to reduce your risk of getting a sexually transmitted infection (STI). It can also help prevent pregnancy.  Several products can help you practice safer sex and reduce your chance of STIs. One of the best is a condom. There are internal and external condoms. You can use a special rubber sheet (dental dam) for protection during oral sex. Disposable gloves can keep your hands from touching blood, semen, or other body fluids that can carry infections.  Remember that birth control methods such as diaphragms, IUDs, foams, and birth control pills do not stop you from getting STIs.  Follow-up care is a key part of your treatment and safety. Be sure to make and go to all appointments, and " "call your doctor if you are having problems. It's also a good idea to know your test results and keep a list of the medicines you take.  How can you care for yourself at home?  Think about getting vaccinated to help prevent hepatitis A, hepatitis B, and human papillomavirus (HPV). They can be spread through sex.  Use a condom every time you have sex. Use an external condom, which goes on the penis. Or use an internal condom, which goes into the vagina or anus.  Make sure you use the right size external condom. A condom that's too small can break easily. A condom that's too big can slip off during sex.  Use a new condom each time you have sex. Be careful not to poke a hole in the condom when you open the wrapper.  Don't use an internal condom and an external condom at the same time.  Never use petroleum jelly (such as Vaseline), grease, hand lotion, baby oil, or anything with oil in it. These products can make holes in the condom.  After intercourse, hold the edge of the condom as you remove it. This will help keep semen from spilling out of the condom.  Do not have sex with anyone who has symptoms of an STI, such as sores on the genitals or mouth.  Do not drink a lot of alcohol or use drugs before sex.  Limit your sex partners. Sex with one partner who has sex only with you can reduce your risk of getting an STI.  Don't share sex toys. But if you do share them, use a condom and clean the sex toys between each use.  Talk to any partners before you have sex. Talk about what you feel comfortable with and whether you have any boundaries with sex. And find out if your partner or partners may be at risk for any STI. Keep in mind that a person may be able to spread an STI even if they do not have symptoms. You and any partners may want to get tested for STIs.  Where can you learn more?  Go to https://www.healthwise.net/patiented  Enter B608 in the search box to learn more about \"Safer Sex: Care Instructions.\"  Current as " of: November 27, 2023               Content Version: 14.0    3008-0186 [employer].   Care instructions adapted under license by your healthcare professional. If you have questions about a medical condition or this instruction, always ask your healthcare professional. [employer] disclaims any warranty or liability for your use of this information.

## 2024-05-24 LAB
C TRACH DNA SPEC QL PROBE+SIG AMP: NEGATIVE
HBV SURFACE AG SERPL QL IA: NONREACTIVE
HCV AB SERPL QL IA: NONREACTIVE
HIV 1+2 AB+HIV1 P24 AG SERPL QL IA: NONREACTIVE
N GONORRHOEA DNA SPEC QL NAA+PROBE: NEGATIVE
T PALLIDUM AB SER QL: NONREACTIVE

## 2024-06-18 NOTE — NURSING NOTE
"Chief Complaint   Patient presents with     Mass     lump on left side of neck       Initial /79 (BP Location: Left arm, Patient Position: Chair, Cuff Size: Adult Regular)  Pulse 72  Temp 98.4  F (36.9  C) (Oral)  Ht 5' 10.25\" (1.784 m)  Wt 145 lb (65.8 kg)  SpO2 96%  BMI 20.66 kg/m2 Estimated body mass index is 20.66 kg/(m^2) as calculated from the following:    Height as of this encounter: 5' 10.25\" (1.784 m).    Weight as of this encounter: 145 lb (65.8 kg).  Medication Reconciliation: complete     Pa Irene Infante MA      "
show

## 2024-08-17 ENCOUNTER — E-VISIT (OUTPATIENT)
Dept: URGENT CARE | Facility: CLINIC | Age: 29
End: 2024-08-17
Payer: COMMERCIAL

## 2024-08-17 DIAGNOSIS — B00.1 HERPES LABIALIS: Primary | ICD-10-CM

## 2024-08-17 PROCEDURE — 99421 OL DIG E/M SVC 5-10 MIN: CPT | Performed by: NURSE PRACTITIONER

## 2024-08-17 RX ORDER — VALACYCLOVIR HYDROCHLORIDE 1 G/1
2000 TABLET, FILM COATED ORAL 2 TIMES DAILY
Qty: 12 TABLET | Refills: 1 | Status: SHIPPED | OUTPATIENT
Start: 2024-08-17 | End: 2024-08-18

## 2024-08-17 NOTE — PATIENT INSTRUCTIONS
Hello,    After reviewing your responses, I've been able to diagnose you with coldsores which are common mouth sores caused by infection with the virus herpes.    Based on your responses, I have prescribed valtrex to treat this. Please follow the instructions on the medication. If you experience irritation of your skin, new rash, or any other new symptoms, you should stop using this medication and contact your primary care provider.    If this treatment does not work for you or your sores are worsening instead of improving or do not resolve in 7 days, please plan to follow-up with your primary care provider. They may be able to offer refills for you for future outbreaks as well.    Thanks for choosing?us?as your health care partner,?   ?   Jamilah Li, CNP?   Cold Sores: Care Instructions  Overview  Cold sores are clusters of small blisters on the lip and skin around or inside the mouth. Often the first sign of a cold sore is a spot that tingles, burns, or itches. A blister usually forms within 24 hours. They are sometimes called fever blisters. The skin around the blisters can be red and inflamed. The blisters can break open, weep a clear fluid, and then scab over after a few days. Cold sores often heal in 7 to 10 days with no scar.  Cold sores are caused by the herpes simplex virus. The virus is spread by skin-to-skin contact. That means that if you have a cold sore and kiss another person, that person could get a cold sore too.  This is the same virus that causes some cases of genital herpes. So if you have a cold sore and have oral sex with someone, that person could get a sore in the genital area.  Cold sores will often go away on their own. But if they're painful, ask your doctor about a prescription antiviral medicine. It can relieve pain, help prevent outbreaks, and shorten the healing time.  Follow-up care is a key part of your treatment and safety. Be sure to make and go to all appointments, and  call your doctor if you are having problems. It's also a good idea to know your test results and keep a list of the medicines you take.  How can you care for yourself at home?  Wash your hands often. And try not to touch your cold sores. This will help to avoid spreading the virus to your eyes or genital area or to other people. This is more likely to happen if this is your first cold sore outbreak.  To help relieve pain, try placing a cold, wet towel on the sore. This can also reduce swelling.  If you are just getting a cold sore, try using over-the-counter docosanol (Abreva) cream to reduce symptoms.  If your doctor prescribed antiviral medicine to relieve pain and shorten the healing time, be sure to follow the directions.  Take an over-the-counter pain medicine, such as acetaminophen (Tylenol), ibuprofen (Advil, Motrin), or naproxen (Aleve), as needed. Read and follow all instructions on the label. No one younger than 20 should take aspirin. It has been linked to Reye syndrome, a serious illness.  Do not take two or more pain medicines at the same time unless the doctor told you to. Many pain medicines have acetaminophen, which is Tylenol. Too much acetaminophen (Tylenol) can be harmful.  Avoid citrus fruit, tomatoes, and other foods that contain acid.  Use over-the-counter ointments, such as Anbesol or Orajel, to numb sore areas in the mouth or on the lips.  Do not kiss or have oral sex with anyone while you have a cold sore.  To prevent cold sores in the future  Avoid long exposure of your lips to sunlight. (Wear a hat to help shade your mouth.)  Using lip balm that contains sunscreen may help reduce outbreaks of cold sores.  Do not share towels, razors, silverware, toothbrushes, or other objects with a person who has a cold sore.  For frequent or painful cold sores, try taking an antiviral medicine daily to decrease outbreaks.  When should you call for help?   Call your doctor now or seek immediate medical  "care if:    Your symptoms are painful and you want to try antiviral medicine.     You have signs of infection, such as:  Increased pain, swelling, warmth, or redness.  Red streaks leading from a cold sore.  Pus draining from a cold sore.  A fever.     You have a cold sore and develop eye pain, eye discharge, or any changes in your vision.   Watch closely for changes in your health, and be sure to contact your doctor if:    The cold sore does not heal in 7 to 10 days.     You get cold sores often.   Where can you learn more?  Go to https://www.[employer].net/patiented  Enter R850 in the search box to learn more about \"Cold Sores: Care Instructions.\"  Current as of: August 6, 2023               Content Version: 14.0    9340-5866 [employer].   Care instructions adapted under license by your healthcare professional. If you have questions about a medical condition or this instruction, always ask your healthcare professional. [employer] disclaims any warranty or liability for your use of this information.      "

## 2024-11-06 ENCOUNTER — E-VISIT (OUTPATIENT)
Dept: URGENT CARE | Facility: CLINIC | Age: 29
End: 2024-11-06
Payer: COMMERCIAL

## 2024-11-06 DIAGNOSIS — B00.1 HERPES LABIALIS: Primary | ICD-10-CM

## 2024-11-06 PROCEDURE — 99207 PR NON-BILLABLE SERV PER CHARTING: CPT | Performed by: NURSE PRACTITIONER

## 2024-11-06 RX ORDER — VALACYCLOVIR HYDROCHLORIDE 1 G/1
2000 TABLET, FILM COATED ORAL 2 TIMES DAILY
Qty: 4 TABLET | Refills: 0 | Status: SHIPPED | OUTPATIENT
Start: 2024-11-06 | End: 2024-11-07

## 2024-11-07 NOTE — PATIENT INSTRUCTIONS
Cold Sores: Care Instructions  Overview  Cold sores are clusters of small blisters on the lip and skin around or inside the mouth. Often the first sign of a cold sore is a spot that tingles, burns, or itches. A blister usually forms within 24 hours. They are sometimes called fever blisters. The skin around the blisters can be red and inflamed. The blisters can break open, weep a clear fluid, and then scab over after a few days. Cold sores often heal in 7 to 10 days with no scar.  Cold sores are caused by the herpes simplex virus. The virus is spread by skin-to-skin contact. That means that if you have a cold sore and kiss another person, that person could get a cold sore too.  This is the same virus that causes some cases of genital herpes. So if you have a cold sore and have oral sex with someone, that person could get a sore in the genital area.  Cold sores will often go away on their own. But if they're painful, ask your doctor about a prescription antiviral medicine. It can relieve pain, help prevent outbreaks, and shorten the healing time.  Follow-up care is a key part of your treatment and safety. Be sure to make and go to all appointments, and call your doctor if you are having problems. It's also a good idea to know your test results and keep a list of the medicines you take.  How can you care for yourself at home?  Wash your hands often. And try not to touch your cold sores. This will help to avoid spreading the virus to your eyes or genital area or to other people. This is more likely to happen if this is your first cold sore outbreak.  To help relieve pain, try placing a cold, wet towel on the sore. This can also reduce swelling.  If you are just getting a cold sore, try using over-the-counter docosanol (Abreva) cream to reduce symptoms.  If your doctor prescribed antiviral medicine to relieve pain and shorten the healing time, be sure to follow the directions.  Take an over-the-counter pain medicine,  "such as acetaminophen (Tylenol), ibuprofen (Advil, Motrin), or naproxen (Aleve), as needed. Read and follow all instructions on the label. No one younger than 20 should take aspirin. It has been linked to Reye syndrome, a serious illness.  Do not take two or more pain medicines at the same time unless the doctor told you to. Many pain medicines have acetaminophen, which is Tylenol. Too much acetaminophen (Tylenol) can be harmful.  Avoid citrus fruit, tomatoes, and other foods that contain acid.  Use over-the-counter ointments, such as Anbesol or Orajel, to numb sore areas in the mouth or on the lips.  Do not kiss or have oral sex with anyone while you have a cold sore.  To prevent cold sores in the future  Avoid long exposure of your lips to sunlight. (Wear a hat to help shade your mouth.)  Using lip balm that contains sunscreen may help reduce outbreaks of cold sores.  Do not share towels, razors, silverware, toothbrushes, or other objects with a person who has a cold sore.  For frequent or painful cold sores, try taking an antiviral medicine daily to decrease outbreaks.  When should you call for help?   Call your doctor now or seek immediate medical care if:    Your symptoms are painful and you want to try antiviral medicine.     You have signs of infection, such as:  Increased pain, swelling, warmth, or redness.  Red streaks leading from a cold sore.  Pus draining from a cold sore.  A fever.     You have a cold sore and develop eye pain, eye discharge, or any changes in your vision.   Watch closely for changes in your health, and be sure to contact your doctor if:    The cold sore does not heal in 7 to 10 days.     You get cold sores often.   Where can you learn more?  Go to https://www.[employer].net/patiented  Enter R850 in the search box to learn more about \"Cold Sores: Care Instructions.\"  Current as of: August 6, 2023  Content Version: 14.2 2024 [facility] ev-social.   Care instructions adapted under " license by your healthcare professional. If you have questions about a medical condition or this instruction, always ask your healthcare professional. [employer] disclaims any warranty or liability for your use of this information.

## 2025-04-23 ENCOUNTER — PATIENT OUTREACH (OUTPATIENT)
Dept: CARE COORDINATION | Facility: CLINIC | Age: 30
End: 2025-04-23
Payer: COMMERCIAL

## 2025-05-07 ENCOUNTER — PATIENT OUTREACH (OUTPATIENT)
Dept: CARE COORDINATION | Facility: CLINIC | Age: 30
End: 2025-05-07
Payer: COMMERCIAL

## 2025-06-28 ENCOUNTER — HEALTH MAINTENANCE LETTER (OUTPATIENT)
Age: 30
End: 2025-06-28